# Patient Record
Sex: FEMALE | Race: BLACK OR AFRICAN AMERICAN | Employment: FULL TIME | URBAN - METROPOLITAN AREA
[De-identification: names, ages, dates, MRNs, and addresses within clinical notes are randomized per-mention and may not be internally consistent; named-entity substitution may affect disease eponyms.]

---

## 2018-01-31 ENCOUNTER — HOSPITAL ENCOUNTER (EMERGENCY)
Age: 48
Discharge: HOME | End: 2018-01-31
Attending: EMERGENCY MEDICINE
Payer: SELF-PAY

## 2018-01-31 VITALS
OXYGEN SATURATION: 100 % | SYSTOLIC BLOOD PRESSURE: 133 MMHG | HEIGHT: 64 IN | BODY MASS INDEX: 24.92 KG/M2 | HEART RATE: 84 BPM | DIASTOLIC BLOOD PRESSURE: 75 MMHG | RESPIRATION RATE: 16 BRPM | WEIGHT: 146 LBS | TEMPERATURE: 98.8 F
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
## 2018-01-31 DIAGNOSIS — M54.6 BILATERAL THORACIC BACK PAIN, UNSPECIFIED CHRONICITY: ICD-10-CM

## 2018-01-31 DIAGNOSIS — M62.838 MUSCLE SPASM: ICD-10-CM

## 2018-01-31 DIAGNOSIS — M54.41 ACUTE RIGHT-SIDED LOW BACK PAIN WITH RIGHT-SIDED SCIATICA: ICD-10-CM

## 2018-01-31 PROCEDURE — 99282 EMERGENCY DEPT VISIT SF MDM: CPT

## 2018-01-31 RX ORDER — CYCLOBENZAPRINE HCL 10 MG
10 TABLET ORAL
Qty: 12 TAB | Refills: 0 | Status: SHIPPED | OUTPATIENT
Start: 2018-01-31

## 2018-01-31 RX ORDER — IBUPROFEN 600 MG/1
600 TABLET ORAL
Qty: 20 TAB | Refills: 0 | Status: SHIPPED | OUTPATIENT
Start: 2018-01-31

## 2018-01-31 NOTE — DISCHARGE INSTRUCTIONS
Learning About Relief for Back Pain  What is back tension and strain? Back strain happens when you overstretch, or pull, a muscle in your back. You may hurt your back in an accident or when you exercise or lift something. Most back pain will get better with rest and time. You can take care of yourself at home to help your back heal.  What can you do first to relieve back pain? When you first feel back pain, try these steps:  · Walk. Take a short walk (10 to 20 minutes) on a level surface (no slopes, hills, or stairs) every 2 to 3 hours. Walk only distances you can manage without pain, especially leg pain. · Relax. Find a comfortable position for rest. Some people are comfortable on the floor or a medium-firm bed with a small pillow under their head and another under their knees. Some people prefer to lie on their side with a pillow between their knees. Don't stay in one position for too long. · Try heat or ice. Try using a heating pad on a low or medium setting, or take a warm shower, for 15 to 20 minutes every 2 to 3 hours. Or you can buy single-use heat wraps that last up to 8 hours. You can also try an ice pack for 10 to 15 minutes every 2 to 3 hours. You can use an ice pack or a bag of frozen vegetables wrapped in a thin towel. There is not strong evidence that either heat or ice will help, but you can try them to see if they help. You may also want to try switching between heat and cold. · Take pain medicine exactly as directed. ¨ If the doctor gave you a prescription medicine for pain, take it as prescribed. ¨ If you are not taking a prescription pain medicine, ask your doctor if you can take an over-the-counter medicine. What else can you do? · Stretch and exercise. Exercises that increase flexibility may relieve your pain and make it easier for your muscles to keep your spine in a good, neutral position. And don't forget to keep walking. · Do self-massage.  You can use self-massage to unwind after work or school or to energize yourself in the morning. You can easily massage your feet, hands, or neck. Self-massage works best if you are in comfortable clothes and are sitting or lying in a comfortable position. Use oil or lotion to massage bare skin. · Reduce stress. Back pain can lead to a vicious [place]: Distress about the pain tenses the muscles in your back, which in turn causes more pain. Learn how to relax your mind and your muscles to lower your stress. Where can you learn more? Go to http://juan-torin.info/. Enter H926 in the search box to learn more about \"Learning About Relief for Back Pain. \"  Current as of: March 21, 2017  Content Version: 11.5  © 6786-0028 [employer]. Care instructions adapted under license by [employer] (which disclaims liability or warranty for this information). If you have questions about a medical condition or this instruction, always ask your healthcare professional. [place] [address] any warranty or liability for your use of this information. Back Pain: Care Instructions  Your Care Instructions    Back pain has many possible causes. It is often related to problems with muscles and ligaments of the back. It may also be related to problems with the nerves, discs, or bones of the back. Moving, lifting, standing, sitting, or sleeping in an awkward way can strain the back. Sometimes you don't notice the injury until later. Arthritis is another common cause of back pain. Although it may hurt a lot, back pain usually improves on its own within several weeks. Most people recover in 12 weeks or less. Using good home treatment and being careful not to stress your back can help you feel better sooner. Follow-up care is a key part of your treatment and safety. Be sure to make and go to all appointments, and call your doctor if you are having problems.  It's also a good idea to know your test results and keep a list of the medicines you take. How can you care for yourself at home? · Sit or lie in positions that are most comfortable and reduce your pain. Try one of these positions when you lie down:  ¨ Lie on your back with your knees bent and supported by large pillows. ¨ Lie on the floor with your legs on the seat of a sofa or chair. Donnel Seller on your side with your knees and hips bent and a pillow between your legs. ¨ Lie on your stomach if it does not make pain worse. · Do not sit up in bed, and avoid soft couches and twisted positions. Bed rest can help relieve pain at first, but it delays healing. Avoid bed rest after the first day of back pain. · Change positions every 30 minutes. If you must sit for long periods of time, take breaks from sitting. Get up and walk around, or lie in a comfortable position. · Try using a heating pad on a low or medium setting for 15 to 20 minutes every 2 or 3 hours. Try a warm shower in place of one session with the heating pad. · You can also try an ice pack for 10 to 15 minutes every 2 to 3 hours. Put a thin cloth between the ice pack and your skin. · Take pain medicines exactly as directed. ¨ If the doctor gave you a prescription medicine for pain, take it as prescribed. ¨ If you are not taking a prescription pain medicine, ask your doctor if you can take an over-the-counter medicine. · Take short walks several times a day. You can start with 5 to 10 minutes, 3 or 4 times a day, and work up to longer walks. Walk on level surfaces and avoid hills and stairs until your back is better. · Return to work and other activities as soon as you can. Continued rest without activity is usually not good for your back. · To prevent future back pain, do exercises to stretch and strengthen your back and stomach. Learn how to use good posture, safe lifting techniques, and proper body mechanics. When should you call for help?   Call your doctor now or seek immediate medical care if:  ? · You have new or worsening numbness in your legs. ? · You have new or worsening weakness in your legs. (This could make it hard to stand up.)   ? · You lose control of your bladder or bowels. ? Watch closely for changes in your health, and be sure to contact your doctor if:  ? · Your pain gets worse. ? · You are not getting better after 2 weeks. Where can you learn more? Go to http://juan-torin.info/. Enter U977 in the search box to learn more about \"Back Pain: Care Instructions. \"  Current as of: March 21, 2017  Content Version: 11.4  © 4978-6891 [employer]. Care instructions adapted under license by [employer] (which disclaims liability or warranty for this information). If you have questions about a medical condition or this instruction, always ask your healthcare professional. [address] any warranty or liability for your use of this information.
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
## 2018-01-31 NOTE — ED NOTES
Michelet Herr is a 41 y.o. female that was discharged in stable condition. The patients diagnosis, condition and treatment were explained to  patient and aftercare instructions were given. The patient verbalized understanding. Patient armband removed and shredded.

## 2018-01-31 NOTE — LETTER
[facility] EMERGENCY DEPT 
[address] [facility] [address] 
[phone] Work/School Note Date: 1/31/2018 To Whom It May concern: 
 
Chyna Andrews was seen and treated today in the emergency room by the following provider(s): 
Attending Provider: Mcihael Parker MD 
Nurse Practitioner: Suly Barrios NP. Chyna Andrews may return to work on 02/02/2018. Sincerely, Suly Barrios NP

## 2018-07-11 ENCOUNTER — TELEPHONE (OUTPATIENT)
Dept: PAIN MANAGEMENT | Age: 48
End: 2018-07-11

## 2023-01-11 ENCOUNTER — OFFICE VISIT (OUTPATIENT)
Dept: ORTHOPEDIC SURGERY | Age: 53
End: 2023-01-11
Payer: COMMERCIAL

## 2023-01-11 VITALS — BODY MASS INDEX: 29.02 KG/M2 | WEIGHT: 170 LBS | TEMPERATURE: 97.7 F | HEIGHT: 64 IN | RESPIRATION RATE: 16 BRPM

## 2023-01-11 DIAGNOSIS — M75.101 TEAR OF RIGHT ROTATOR CUFF, UNSPECIFIED TEAR EXTENT, UNSPECIFIED WHETHER TRAUMATIC: Primary | ICD-10-CM

## 2023-01-11 DIAGNOSIS — M25.511 CHRONIC RIGHT SHOULDER PAIN: ICD-10-CM

## 2023-01-11 DIAGNOSIS — G89.29 CHRONIC RIGHT SHOULDER PAIN: ICD-10-CM

## 2023-01-11 RX ORDER — MELOXICAM 15 MG/1
15 TABLET ORAL
Qty: 30 TABLET | Refills: 1 | Status: SHIPPED | OUTPATIENT
Start: 2023-01-11

## 2023-01-11 NOTE — PROGRESS NOTES
Laurent Russell  1970   Chief Complaint   Patient presents with    Shoulder Pain     Rt         HISTORY OF PRESENT ILLNESS  Laurent Russell is a 50 y.o. female who presents today for evaluation of right shoulder pain. She rates her pain 7/10 today. Pain has been present for around 9 months. No specific injury. Her PCP told the patient she had a rotator cuff tear. She notes pain in the front of the shoulder which can also extend to the shoulder blade region. Has pain with certain movements. Endorses night pain. She has two jobs, one of which requires cleaning. Has tried following treatments: Injections:NO; Brace:NO; Therapy:NO; Cane/Crutch:NO       Allergies   Allergen Reactions    Flagyl [Metronidazole] Nausea and Vomiting        Past Medical History:   Diagnosis Date    GERD (gastroesophageal reflux disease)       Social History     Socioeconomic History    Marital status:      Spouse name: Not on file    Number of children: Not on file    Years of education: Not on file    Highest education level: Not on file   Occupational History    Not on file   Tobacco Use    Smoking status: Never    Smokeless tobacco: Never   Substance and Sexual Activity    Alcohol use: Yes     Comment: rare    Drug use: No    Sexual activity: Not on file   Other Topics Concern    Not on file   Social History Narrative    Not on file     Social Determinants of Health     Financial Resource Strain: Not on file   Food Insecurity: Not on file   Transportation Needs: Not on file   Physical Activity: Not on file   Stress: Not on file   Social Connections: Not on file   Intimate Partner Violence: Not on file   Housing Stability: Not on file      Past Surgical History:   Procedure Laterality Date    HX  SECTION      HX GYN      ablation    HX ORTHOPAEDIC      left wrist carpal tunnel    HX PARTIAL HYSTERECTOMY      HX TUBAL LIGATION        History reviewed. No pertinent family history.    Current Outpatient Medications Medication Sig    ibuprofen (MOTRIN) 600 mg tablet Take 1 Tab by mouth every six (6) hours as needed for Pain. cyclobenzaprine (FLEXERIL) 10 mg tablet Take 1 Tab by mouth three (3) times daily as needed for Muscle Spasm(s). No current facility-administered medications for this visit. REVIEW OF SYSTEM   Patient denies: Weight loss, Fever/Chills, HA, Visual changes, Fatigue, Chest pain, SOB, Abdominal pain, N/V/D/C, Blood in stool or urine, Edema. Pertinent positive as above in HPI. All others were negative    PHYSICAL EXAM:   Visit Vitals  Temp 97.7 °F (36.5 °C) (Temporal)   Resp 16   Ht 5' 4\" (1.626 m)   Wt 170 lb (77.1 kg)   LMP 01/23/2014   BMI 29.18 kg/m²     The patient is a well-developed, well-nourished female   in no acute distress. The patient is alert and oriented times three. The patient is alert and oriented times three. Mood and affect are normal.  LYMPHATIC: lymph nodes are not enlarged and are within normal limits  SKIN: normal in color and non tender to palpation. There are no bruises or abrasions noted. NEUROLOGICAL: Motor sensory exam is within normal limits. Reflexes are equal bilaterally. There is normal sensation to pinprick and light touch  MUSCULOSKELETAL:  Examination Right shoulder   Skin Intact   AC joint tenderness -   Biceps tenderness -   Forward flexion/Elevation ROM 60   Active abduction ROM 60   Glenohumeral abduction 90   External rotation ROM 30   Internal rotation ROM 30   Apprehension -   Shiras Relocation -   Jerk -   Load and Shift -   Obriens -   Speeds -   Impingement sign +   Supraspinatus/Empty Can +   External Rotation Strength -, 5/5   Lift Off/Belly Press -, 5/5   Neurovascular Intact        IMAGING: XR of the right shoulder with 3 views obtained in the office dated 1/11/2023 was reviewed and read by Dr. Jacy Woods: No acute abnormalities       IMPRESSION:      ICD-10-CM ICD-9-CM    1.  Tear of right rotator cuff, unspecified tear extent, unspecified whether traumatic  M75.101 840.4       2. Chronic right shoulder pain  M25.511 719.41 AMB POC XRAY, SHOULDER; COMPLETE, 2+    G89.29 338.29            PLAN:  1. Pt presents today with right shoulder pain and I am ordering a right shoulder MRI to r/o RCT. Was also prescribed Mobic. Patient was provided with physician-directed home exercise program in the office today. Risk factors include: n/a  2. No ultrasound exam indicated today  3. No cortisone injection indicated today   4. No Physical/Occupational Therapy indicated today  5. Yes diagnostic test indicated today: MRI R SHOULDER  6. No durable medical equipment indicated today  7. No referral indicated today   8. Yes medications indicated today: MOBIC  9. No Narcotic indicated today      RTC following MRI      Scribed by Raffy Carrington) as dictated by Theressa Bamberger, MD    I, Dr. Theressa Bamberger, confirm that all documentation is accurate.     Theressa Bamberger, M.D.   [facility]

## 2023-01-19 ENCOUNTER — TELEPHONE (OUTPATIENT)
Dept: ORTHOPEDIC SURGERY | Age: 53
End: 2023-01-19

## 2023-01-19 DIAGNOSIS — F41.9 ANXIETY: Primary | ICD-10-CM

## 2023-01-19 DIAGNOSIS — M75.101 TEAR OF RIGHT ROTATOR CUFF, UNSPECIFIED TEAR EXTENT, UNSPECIFIED WHETHER TRAUMATIC: ICD-10-CM

## 2023-01-19 RX ORDER — DICLOFENAC SODIUM 75 MG/1
75 TABLET, DELAYED RELEASE ORAL 2 TIMES DAILY WITH MEALS
Qty: 60 TABLET | Refills: 2 | Status: SHIPPED | OUTPATIENT
Start: 2023-01-19 | End: 2023-01-20 | Stop reason: SDUPTHER

## 2023-01-19 RX ORDER — LORAZEPAM 1 MG/1
TABLET ORAL
Qty: 3 TABLET | Refills: 0 | Status: SHIPPED | OUTPATIENT
Start: 2023-01-19

## 2023-01-19 NOTE — TELEPHONE ENCOUNTER
Patient states the Meloxicam is not  helping her with the pain and just makes her super sleepy. She would like to know if she can have something different. Patient will also be having an MRI on 01/24 and would like something for anxiety to be sent over to her pharmacy.

## 2023-01-19 NOTE — TELEPHONE ENCOUNTER
Stop taking mobic. Voltaren sent instead. Ativan sent for MRI to help her relax.  Make sure she has apt with [facility] following MRI

## 2023-01-19 NOTE — TELEPHONE ENCOUNTER
Patient wants to know if she can take Tylenol during the day and the voltaren at night. Please advise.

## 2023-01-20 NOTE — TELEPHONE ENCOUNTER
Spoke with patient. Patient verbalized understanding that she may take Tylenol during the day and Voltaren at night. Patient states that the pharmacy did not have the Rx.   I instructed the patient that in our records, it does, in fact show that the pharmacy did receive confirmation on 1-19-23 at 12:11pm.

## 2023-01-24 ENCOUNTER — HOSPITAL ENCOUNTER (OUTPATIENT)
Dept: MRI IMAGING | Age: 53
Discharge: HOME | End: 2023-01-24
Attending: ORTHOPAEDIC SURGERY
Payer: COMMERCIAL

## 2023-01-24 DIAGNOSIS — M75.101 TEAR OF RIGHT ROTATOR CUFF, UNSPECIFIED TEAR EXTENT, UNSPECIFIED WHETHER TRAUMATIC: ICD-10-CM

## 2023-01-24 PROCEDURE — 73221 MRI JOINT UPR EXTREM W/O DYE: CPT

## 2023-01-31 ENCOUNTER — OFFICE VISIT (OUTPATIENT)
Dept: ORTHOPEDIC SURGERY | Age: 53
End: 2023-01-31
Payer: COMMERCIAL

## 2023-01-31 VITALS — HEIGHT: 64 IN | BODY MASS INDEX: 29.19 KG/M2 | TEMPERATURE: 97.8 F | RESPIRATION RATE: 16 BRPM | WEIGHT: 171 LBS

## 2023-01-31 DIAGNOSIS — M75.111 INCOMPLETE TEAR OF RIGHT ROTATOR CUFF, UNSPECIFIED WHETHER TRAUMATIC: Primary | ICD-10-CM

## 2023-01-31 PROCEDURE — 99213 OFFICE O/P EST LOW 20 MIN: CPT | Performed by: PHYSICIAN ASSISTANT

## 2023-01-31 NOTE — PROGRESS NOTES
Laurent Russell  1970   Chief Complaint   Patient presents with    Results     Rt shoulder MRI        HISTORY OF PRESENT ILLNESS  Laurent Russell is a 50 y.o. female who presents today for reevaluation of right shoulder and MRI review. Patient rates pain as 8/10 today. Pain has been present for around 9 months. No specific injury. Her PCP told the patient she had a rotator cuff tear. She notes pain in the front of the shoulder which can also extend to the shoulder blade region. Has pain with certain movements. She reports no change in symptoms since last OV, continues to have significant night pain. She has two jobs, one of which requires cleaning. Patient denies any fever, chills, chest pain, shortness of breath or calf pain. The remainder of the review of systems in negative. There are no new illness or injuries to report since last seen in the office. There are no changes to medications, allergies, family or social history. PHYSICAL EXAM:   Visit Vitals  Temp 97.8 °F (36.6 °C) (Temporal)   Resp 16   Ht 5' 4\" (1.626 m)   Wt 171 lb (77.6 kg)   LMP 01/23/2014   BMI 29.35 kg/m²     The patient is a well-developed, well-nourished female   in no acute distress. The patient is alert and oriented times three. The patient is alert and oriented times three. Mood and affect are normal.  LYMPHATIC: lymph nodes are not enlarged and are within normal limits  SKIN: normal in color and non tender to palpation. There are no bruises or abrasions noted. NEUROLOGICAL: Motor sensory exam is within normal limits. Reflexes are equal bilaterally.  There is normal sensation to pinprick and light touch  MUSCULOSKELETAL:  Examination Right shoulder   Skin Intact   AC joint tenderness -   Biceps tenderness -   Forward flexion/Elevation ROM 60   Active abduction ROM 60   Glenohumeral abduction 90   External rotation ROM 30   Internal rotation ROM 30   Apprehension -   Shiras Relocation -   Jerk -   Load and Shift - Viktor Dawson -   Speeds -   Impingement sign +   Supraspinatus/Empty Can +   External Rotation Strength -, 5/5   Lift Off/Belly Press -, 5/5   Neurovascular Intact         IMAGING: MRI of right shoulder dated 1/24/2023 was reviewed and read by myself reveals:   IMPRESSION:  1. Bursal sided partial-thickness tearing of anterior supraspinatus tendon with  width high-grade component. No full-thickness tendon tear or tendon retraction  demonstrated. 2. Nondisplaced labral tear shown posteriorly at 9:00. XR of the right shoulder with 3 views obtained in the office dated 1/11/2023 was reviewed and read by Dr. Marcin Velasquez: No acute abnormalities     IMPRESSION:      ICD-10-CM ICD-9-CM    1. Incomplete tear of right rotator cuff, unspecified whether traumatic  M75.111 840.4            PLAN:   1. I discussed the risks and benefits and potential adverse outcomes of both operative vs non operative treatment of right shoulder partial RCT with the patient and patient wishes to proceed with arthroscopic right shoulder RCR. Risks of operative intervention include but not limited to bleeding, infection, deep vein thrombosis, pulmonary embolism, death, limb length discrepancy, reflexive sympathetic dystrophy, fat embolism syndrome,damage to blood vessels and nerves, malunion, non-union, delayed union, failure of hardware, post traumatic arthritis, stroke, heart attack, and death. Patient understands that infection may arise and may require numerous surgeries. The patient was counseled at length about the risks of mary kay Covid-19 during their perioperative period and any recovery window from their procedure. The patient was made aware that mary kay Covid-19  may worsen their prognosis for recovering from their procedure and lend to a higher morbidity and/or mortality risk. All material risks, benefits, and reasonable alternatives including postponing the procedure were discussed.  The patient does  wish to proceed with the procedure at this time. History and physical exam to be preformed at a later date. Risk factors include: n/a  2. No ultrasound exam indicated today  3. No cortisone injection indicated today   4. No Physical/Occupational Therapy indicated today  5. No diagnostic test indicated today:   6. No durable medical equipment indicated today  7. No referral indicated today   8. No medications indicated today:   9.  No Narcotic indicated today        RTC H&P      Scribed by Christina Bravo) as dictated by OLIVIA Ramirez PA-C [phone]

## 2023-02-06 ENCOUNTER — HOSPITAL ENCOUNTER (OUTPATIENT)
Dept: LAB | Age: 53
Discharge: HOME | End: 2023-02-06
Payer: COMMERCIAL

## 2023-02-06 DIAGNOSIS — M75.111 INCOMPLETE TEAR OF RIGHT ROTATOR CUFF, UNSPECIFIED WHETHER TRAUMATIC: ICD-10-CM

## 2023-02-06 DIAGNOSIS — Z01.818 PREOP EXAMINATION: Primary | ICD-10-CM

## 2023-02-06 DIAGNOSIS — Z01.818 PREOP EXAMINATION: ICD-10-CM

## 2023-02-06 LAB
ALBUMIN SERPL-MCNC: 4.1 G/DL (ref 3.4–5)
ALBUMIN/GLOB SERPL: 1.2 (ref 0.8–1.7)
ALP SERPL-CCNC: 76 U/L (ref 45–117)
ALT SERPL-CCNC: 21 U/L (ref 13–56)
ANION GAP SERPL CALC-SCNC: 5 MMOL/L (ref 3–18)
AST SERPL-CCNC: 11 U/L (ref 10–38)
ATRIAL RATE: 79 BPM
BASOPHILS # BLD: 0 K/UL (ref 0–0.1)
BASOPHILS NFR BLD: 0 % (ref 0–2)
BILIRUB SERPL-MCNC: 0.4 MG/DL (ref 0.2–1)
BUN SERPL-MCNC: 11 MG/DL (ref 7–18)
BUN/CREAT SERPL: 15 (ref 12–20)
CALCIUM SERPL-MCNC: 9.5 MG/DL (ref 8.5–10.1)
CALCULATED P AXIS, ECG09: 44 DEGREES
CALCULATED R AXIS, ECG10: -6 DEGREES
CALCULATED T AXIS, ECG11: 20 DEGREES
CHLORIDE SERPL-SCNC: 107 MMOL/L (ref 100–111)
CO2 SERPL-SCNC: 28 MMOL/L (ref 21–32)
CREAT SERPL-MCNC: 0.73 MG/DL (ref 0.6–1.3)
DIAGNOSIS, 93000: NORMAL
DIFFERENTIAL METHOD BLD: ABNORMAL
EOSINOPHIL # BLD: 0 K/UL (ref 0–0.4)
EOSINOPHIL NFR BLD: 1 % (ref 0–5)
ERYTHROCYTE [DISTWIDTH] IN BLOOD BY AUTOMATED COUNT: 12.6 % (ref 11.6–14.5)
GLOBULIN SER CALC-MCNC: 3.4 G/DL (ref 2–4)
GLUCOSE SERPL-MCNC: 96 MG/DL (ref 74–99)
HCT VFR BLD AUTO: 35.2 % (ref 35–45)
HGB BLD-MCNC: 11.6 G/DL (ref 12–16)
IMM GRANULOCYTES # BLD AUTO: 0 K/UL (ref 0–0.04)
IMM GRANULOCYTES NFR BLD AUTO: 0 % (ref 0–0.5)
LYMPHOCYTES # BLD: 1.9 K/UL (ref 0.9–3.6)
LYMPHOCYTES NFR BLD: 38 % (ref 21–52)
MCH RBC QN AUTO: 28.1 PG (ref 24–34)
MCHC RBC AUTO-ENTMCNC: 33 G/DL (ref 31–37)
MCV RBC AUTO: 85.2 FL (ref 78–100)
MONOCYTES # BLD: 0.4 K/UL (ref 0.05–1.2)
MONOCYTES NFR BLD: 8 % (ref 3–10)
NEUTS SEG # BLD: 2.7 K/UL (ref 1.8–8)
NEUTS SEG NFR BLD: 53 % (ref 40–73)
NRBC # BLD: 0 K/UL (ref 0–0.01)
NRBC BLD-RTO: 0 PER 100 WBC
P-R INTERVAL, ECG05: 150 MS
PLATELET # BLD AUTO: 346 K/UL (ref 135–420)
PMV BLD AUTO: 10.3 FL (ref 9.2–11.8)
POTASSIUM SERPL-SCNC: 3.5 MMOL/L (ref 3.5–5.5)
PROT SERPL-MCNC: 7.5 G/DL (ref 6.4–8.2)
Q-T INTERVAL, ECG07: 386 MS
QRS DURATION, ECG06: 68 MS
QTC CALCULATION (BEZET), ECG08: 442 MS
RBC # BLD AUTO: 4.13 M/UL (ref 4.2–5.3)
SODIUM SERPL-SCNC: 140 MMOL/L (ref 136–145)
VENTRICULAR RATE, ECG03: 79 BPM
WBC # BLD AUTO: 5.1 K/UL (ref 4.6–13.2)

## 2023-02-06 PROCEDURE — 36415 COLL VENOUS BLD VENIPUNCTURE: CPT

## 2023-02-06 PROCEDURE — 80053 COMPREHEN METABOLIC PANEL: CPT

## 2023-02-06 PROCEDURE — 85025 COMPLETE CBC W/AUTO DIFF WBC: CPT

## 2023-02-06 PROCEDURE — 93005 ELECTROCARDIOGRAM TRACING: CPT

## 2023-02-07 ENCOUNTER — TELEPHONE (OUTPATIENT)
Dept: ORTHOPEDIC SURGERY | Age: 53
End: 2023-02-07

## 2023-02-07 NOTE — TELEPHONE ENCOUNTER
Patient called in and stated that at her last visit [facility] told her to call in if she needed to be taken out of work. She says she's in so much pain right now at work and is calling to request that note. Patient can be reached at [phone].

## 2023-02-07 NOTE — TELEPHONE ENCOUNTER
Per other message by PA [facility], will keep patient out of work until surgery.

## 2023-02-10 ENCOUNTER — TELEPHONE (OUTPATIENT)
Dept: ORTHOPEDIC SURGERY | Age: 53
End: 2023-02-10

## 2023-02-10 NOTE — TELEPHONE ENCOUNTER
Patient is scheduled for surgery on 2- for her right rotator cuff repair. She needs a work note stating  her surgery and approximately how long she will be out of work to recover. Please call patient when note is ready.  [phone]

## 2023-02-10 NOTE — LETTER
NOTIFICATION RETURN TO WORK / SCHOOL    2/10/2023 3:09 PM    Ms. [place]cedo  [address] [id number]      To Whom It May Concern:    Lio Castellanos is currently under the care of [facility] Mer[facility]. She will be having surgery on  2-. She will be out of work X 12 weeks unless sedentary duty is available. If there are questions or concerns please have the patient contact our office.         Sincerely,      Endy Hemphill MD

## 2023-02-13 ENCOUNTER — CLINICAL DOCUMENTATION (OUTPATIENT)
Age: 53
End: 2023-02-13

## 2023-02-14 ENCOUNTER — OFFICE VISIT (OUTPATIENT)
Age: 53
End: 2023-02-14

## 2023-02-14 VITALS
SYSTOLIC BLOOD PRESSURE: 112 MMHG | DIASTOLIC BLOOD PRESSURE: 76 MMHG | WEIGHT: 171 LBS | HEIGHT: 64 IN | BODY MASS INDEX: 29.19 KG/M2 | TEMPERATURE: 97.8 F

## 2023-02-14 DIAGNOSIS — M75.111 INCOMPLETE ROTATOR CUFF TEAR OR RUPTURE OF RIGHT SHOULDER, NOT SPECIFIED AS TRAUMATIC: Primary | ICD-10-CM

## 2023-02-14 RX ORDER — GABAPENTIN 100 MG/1
100 CAPSULE ORAL 3 TIMES DAILY
Qty: 90 CAPSULE | Refills: 0 | Status: SHIPPED | OUTPATIENT
Start: 2023-02-14 | End: 2023-03-16

## 2023-02-14 RX ORDER — ACETAMINOPHEN 325 MG/1
1000 TABLET ORAL ONCE
Status: CANCELLED | OUTPATIENT
Start: 2023-02-14 | End: 2023-02-14

## 2023-02-14 RX ORDER — GABAPENTIN 100 MG/1
300 CAPSULE ORAL ONCE
Status: CANCELLED | OUTPATIENT
Start: 2023-02-14 | End: 2023-02-14

## 2023-02-14 RX ORDER — CELECOXIB 100 MG/1
200 CAPSULE ORAL ONCE
Status: CANCELLED | OUTPATIENT
Start: 2023-02-14 | End: 2023-02-14

## 2023-02-14 RX ORDER — OXYCODONE HYDROCHLORIDE 5 MG/1
5 TABLET ORAL EVERY 4 HOURS PRN
Qty: 40 TABLET | Refills: 0 | Status: SHIPPED | OUTPATIENT
Start: 2023-02-14 | End: 2023-02-21

## 2023-02-14 NOTE — PATIENT INSTRUCTIONS
Dr. Vy Nichols Shoulder Arthroscopy Information    What is the surgery? This is an outpatient procedure at either [facility] or [address] will be completely asleep for the procedure. Dr. Vy Nichols will make somewhere between 2-5 small incisions in your shoulder depending on the amount of work to be completed. He will take a tour of your shoulder with the camera and fix everything that needs to be fixed during your surgery. Total surgery takes about 45 mins to an hour and half depending on how much needed to be repaired    What can you expect after surgery? You will have a bulky dressing on your shoulder that you can remove 2 days after surgery. You will be able to shower 2 days after surgery but no soaking in a bath, hot tub, ocean or pool x 2 weeks to allow for full wound healing  You will be in a sling for 5-6 weeks depending on your repair. You will wear this sling whenever you are active, up moving around, and sleeping at night. This sling is to keep you from moving your arm on your own. You are essentially one armed until you are out of your sling. This means no reaching, pulling, grabbing or lifting with the operative arm. Dr. Vy Nichols will start physical therapy for you the first business day after surgery. While you cannot move your arm we allow physical therapy to gently move your shoulder. We call this passive range of motion. The goal of this is to decrease your stiffness and in turn decrease your post operative pain. Plan on being in physical therapy for 10-12 weeks    When can I return to work? Most patients return to desk work only after 2 weeks. You are able to type but there is no overhead work or lifting  You will start some gentle lifting up to 5-10lbs at about 8-10 weeks post operatively. You will gradually increase how much you are able to lift after this point under the guidance of Dr. Vy Nichols, his physician assistant and physical therapy.   At 6 months you are able to do all activities as tolerated but it may take you a full 9-12 months to fully recover from your surgery    Not all shoulder arthroscopies are the same. The specifics of your individual case will be discussed at length with you by Dr. Amy Marcum and his Physician Assistant. Anastasia Ceja  Surgical Coordinator  [address] [place] [address]. [facility].  [address], [email or web address]  P: [phone]  F: [phone]

## 2023-02-14 NOTE — H&P
HISTORY AND PHYSICAL          Patient: Anastasia Cancino                MRN: [id number]       SSN: [id number]  YOB: 1970          AGE: 50 y.o. SEX: female      Patient scheduled for:  right shoulder arthroscopic rotator cuff repair    Surgeon: Bandar Rossi MD    ANESTHESIA TYPE:  General    HISTORY:     The patient was seen in the office today for a preoperative history and physical for an upcoming above listed surgery. The patient is a pleasant 50 y.o. female who has a history of right shoulder pain . Patient rates pain as 8/10 today. Pain has been present for around 9 months. No specific injury. Her PCP told the patient she had a rotator cuff tear. She notes pain in the front of the shoulder which can also extend to the shoulder blade region. Has pain with certain movements. She reports no change in symptoms since last OV, continues to have significant  night pain. She has two jobs, one of which requires cleaning. Due to the current findings, affected activity of daily living and continued pain and discomfort, surgical intervention is indicated. The alternatives, risks, and complications, including but not limited to infection, blood loss, need for blood transfusion, neurovascular damage, sanju-incisional numbness, subcutaneous hematoma, bone fracture, anesthetic complications, DVT, PE, death, RSD, postoperative stiffness and pain, possible surgical scar, delayed healing and nonhealing, reflexive sympathetic dystrophy, damage to blood vessels and nerves, need for more surgery, MI, and stroke,  failure of hardware, gait disturbances,have been discussed. The patient understands and wishes to proceed with surgery.      PAST MEDICAL HISTORY:     Past Medical History:   Diagnosis Date    GERD (gastroesophageal reflux disease)         CURRENT MEDICATIONS:     Current Outpatient Medications   Medication Sig    cyclobenzaprine (FLEXERIL) 10 MG tablet Take 10 mg by mouth 3 times daily as needed    ibuprofen (ADVIL;MOTRIN) 600 MG tablet Take 600 mg by mouth every 6 hours as needed    meloxicam (MOBIC) 15 MG tablet Take 15 mg by mouth daily (with breakfast)     No current facility-administered medications for this visit. ALLERGIES:     Allergies   Allergen Reactions    Metronidazole Nausea And Vomiting         SURGICAL HISTORY:     Past Surgical History:   Procedure Laterality Date     SECTION      GYN      ablation    ORTHOPEDIC SURGERY      left wrist carpal tunnel    PARTIAL HYSTERECTOMY (CERVIX NOT REMOVED)      TUBAL LIGATION          SOCIAL HISTORY:     Social History       Tobacco History       Smoking Status  Never      Smokeless Tobacco Use  Never              Alcohol History       Alcohol Use Status  Yes              Drug Use       Drug Use Status  No              Sexual Activity       Sexually Active  Not Asked                     FAMILY HISTORY:     No family history on file. REVIEW OF SYSTEMS:     Negative for fevers, chills, chest pain, shortness of breath, weight loss, recent illness     General: Negative for fever and chills. No unexpected change in weight. Denies fatigue. No change in appetite. Skin: Negative for rash or itching. HEENT: Negative for congestion, sore throat, neck pain and neck stiffness. No change in vision or hearing. Hasn't noted any enlarged lymph nodes in the neck. Cardiovascular:  Negative for chest pain and palpitations. Has not noted pedal edema. Respiratory: Negative for cough, colds, sinus, hemoptysis, shortness of breath and wheezing. Gastrointestinal: Negative for nausea and vomiting, rectal bleeding, coffee ground emesis, abdominal pain, diarrhea and constipation. Genitourinary: Negative for dysuria, frequency urgency, or burning on micturition. No flank pain, no foul smelling urine, no difficulty with initiating urination. Hematological: Negative for bleeding or easy bruising.   Musculoskeletal: Negative  for arthralgias, back pain or neck pain. Neurological: Negative for dizziness, seizures or syncopal episodes. Denies headaches. Endocrine: Denies excessive thirst.  No heat/cold intolerance. Psychiatric: Negative for depression or insomnia. PHYSICAL EXAMINATION:     VITALS: There were no vitals taken for this visit. GEN:  Well developed, well nourished 50 y.o. female in no acute distress. HEENT: Normocephalic and atraumatic. Eyes: Conjunctivae and EOM are normal.Pupils are equal, round, and reactive to light. External ear normal appearance, external nose normal appearing. Mouth/Throat: Oropharynx is clear and moist, able to handle oral secretions w/out difficulty, airway patent  NECK: Supple. Normal ROM, No lymphadenopathy. Trachea is midline. No bruising, swelling or deformity  RESP: Clear to auscultation bilaterally. No wheezes, rales, rhonchi. Normal effort and breath sounds. No respiratory distress  CARDIO:  Normal rate, regular rhythm and normal heart sounds. No MGR. ABDOMEN: Soft, non-tender, non-distended, normoactive bowel sounds in all four quadrants. There is no tenderness. There is no rebound and no guarding. BACK: No CVA or spinal tenderness  BREAST:  Deferred  PELVIC:    Deferred   RECTAL:  Deferred   :           Deferred  EXTREMITIES: EXAMINATION OF: right shoulder      Examination  Right shoulder     Skin  Intact     AC joint tenderness  -     Biceps tenderness  -     Forward flexion/Elevation ROM  60     Active abduction ROM  60     Glenohumeral abduction  90     External rotation ROM  30     Internal rotation ROM  30     Apprehension  -     Shiva's Relocation  -     Jerk  -     Load and Shift  -     O'briens  -     Speeds  -     Impingement sign  +     Supraspinatus/Empty Can  +     External Rotation Strength  -, 5/5     Lift Off/Belly Press  -, 5/5        Neurovascular  Intact               NEUROVASCULAR: Sensation intact to light touch and strength grossly intact and symmetrical. No nystagmus. Positive distal pulses and capillary refill. DVT ASSESSMENT:  There is not calf tenderness. No evidence of DVT seen on physical exam.  MOTOR: In tact  PSYCH: Alert an oriented to person, place and time. Mood, memory, affect, behavior and judgment normal       RADIOGRAPHS & DIAGNOSTIC STUDIES:     MRI/xray reveals :   IMAGING: MRI of right shoulder  dated 1/24/2023 was reviewed and read by myself reveals:    IMPRESSION:  1. Bursal sided partial-thickness tearing of anterior supraspinatus tendon with   width high-grade component. No full-thickness tendon tear or tendon retraction   demonstrated. 2. Nondisplaced labral tear shown posteriorly at 9:00. XR of the right shoulder with 3 views obtained in the office dated 1/11/2023 was reviewed and read by Dr. Cheng Ke: No acute abnormalities       LABS:       @CBC:   Lab Results   Component Value Date/Time    WBC 5.1 02/06/2023 09:46 AM    RBC 4.13 02/06/2023 09:46 AM     BMP:   Lab Results   Component Value Date/Time    GLUCOSE 96 02/06/2023 09:46 AM    CO2 28 02/06/2023 09:46 AM    BUN 11 02/06/2023 09:46 AM    CREATININE 0.73 02/06/2023 09:46 AM    CALCIUM 9.5 02/06/2023 09:46 AM   @    Preoperative labs were reviewed and are substantially within normal limits   EKG: unchanged from previous tracings. ASSESSMENT:       Encounter Diagnosis   Name Primary? Incomplete rotator cuff tear or rupture of right shoulder, not specified as traumatic Yes       PLAN:     Again, the alternatives, risks, and complications, as well as expected outcome were discussed. The patient understands and agrees to proceed with right shoulder arthroscopic rotator cuff repair. The patient was counseled at length about the risks of blaise Covid-19 during their perioperative period and any recovery window from their procedure.   The patient was made aware that blaise Covid-19  may worsen their prognosis for recovering from their procedure and lend to a higher morbidity and/or mortality risk. All material risks, benefits, and reasonable alternatives including postponing the procedure were discussed. The patient does  wish to proceed with the procedure at this time. Patient given orders listed below:    No orders of the defined types were placed in this encounter.         Hannah Sanchez PA-C  2/14/2023  12:40 PM

## 2023-02-14 NOTE — PROGRESS NOTES
HISTORY AND PHYSICAL          Patient: Casie Kraus                MRN: [id number]       SSN: [id number]  YOB: 1970          AGE: 50 y.o. SEX: female      Patient scheduled for:  right shoulder arthroscopic rotator cuff repair    Surgeon: Jonna Lopez MD    ANESTHESIA TYPE:  General    HISTORY:     The patient was seen in the office today for a preoperative history and physical for an upcoming above listed surgery. The patient is a pleasant 50 y.o. female who has a history of right shoulder pain . Patient rates pain as 8/10 today. Pain has been present for around 9 months. No specific injury. Her PCP told the patient she had a rotator cuff tear. She notes pain in the front of the shoulder which can also extend to the shoulder blade region. Has pain with certain movements. She reports no change in symptoms since last OV, continues to have significant  night pain. She has two jobs, one of which requires cleaning. Due to the current findings, affected activity of daily living and continued pain and discomfort, surgical intervention is indicated. The alternatives, risks, and complications, including but not limited to infection, blood loss, need for blood transfusion, neurovascular damage, sanju-incisional numbness, subcutaneous hematoma, bone fracture, anesthetic complications, DVT, PE, death, RSD, postoperative stiffness and pain, possible surgical scar, delayed healing and nonhealing, reflexive sympathetic dystrophy, damage to blood vessels and nerves, need for more surgery, MI, and stroke,  failure of hardware, gait disturbances,have been discussed. The patient understands and wishes to proceed with surgery.      PAST MEDICAL HISTORY:     Past Medical History:   Diagnosis Date    GERD (gastroesophageal reflux disease)         CURRENT MEDICATIONS:     Current Outpatient Medications   Medication Sig    cyclobenzaprine (FLEXERIL) 10 MG tablet Take 10 mg by mouth 3 times daily as needed    ibuprofen (ADVIL;MOTRIN) 600 MG tablet Take 600 mg by mouth every 6 hours as needed    meloxicam (MOBIC) 15 MG tablet Take 15 mg by mouth daily (with breakfast)     No current facility-administered medications for this visit. ALLERGIES:     Allergies   Allergen Reactions    Metronidazole Nausea And Vomiting         SURGICAL HISTORY:     Past Surgical History:   Procedure Laterality Date     SECTION      GYN      ablation    ORTHOPEDIC SURGERY      left wrist carpal tunnel    PARTIAL HYSTERECTOMY (CERVIX NOT REMOVED)      TUBAL LIGATION          SOCIAL HISTORY:     Social History       Tobacco History       Smoking Status  Never      Smokeless Tobacco Use  Never              Alcohol History       Alcohol Use Status  Yes              Drug Use       Drug Use Status  No              Sexual Activity       Sexually Active  Not Asked                     FAMILY HISTORY:     No family history on file. REVIEW OF SYSTEMS:     Negative for fevers, chills, chest pain, shortness of breath, weight loss, recent illness     General: Negative for fever and chills. No unexpected change in weight. Denies fatigue. No change in appetite. Skin: Negative for rash or itching. HEENT: Negative for congestion, sore throat, neck pain and neck stiffness. No change in vision or hearing. Hasn't noted any enlarged lymph nodes in the neck. Cardiovascular:  Negative for chest pain and palpitations. Has not noted pedal edema. Respiratory: Negative for cough, colds, sinus, hemoptysis, shortness of breath and wheezing. Gastrointestinal: Negative for nausea and vomiting, rectal bleeding, coffee ground emesis, abdominal pain, diarrhea and constipation. Genitourinary: Negative for dysuria, frequency urgency, or burning on micturition. No flank pain, no foul smelling urine, no difficulty with initiating urination. Hematological: Negative for bleeding or easy bruising.   Musculoskeletal: Negative  for arthralgias, back pain or neck pain. Neurological: Negative for dizziness, seizures or syncopal episodes. Denies headaches. Endocrine: Denies excessive thirst.  No heat/cold intolerance. Psychiatric: Negative for depression or insomnia. PHYSICAL EXAMINATION:     VITALS: There were no vitals taken for this visit. GEN:  Well developed, well nourished 50 y.o. female in no acute distress. HEENT: Normocephalic and atraumatic. Eyes: Conjunctivae and EOM are normal.Pupils are equal, round, and reactive to light. External ear normal appearance, external nose normal appearing. Mouth/Throat: Oropharynx is clear and moist, able to handle oral secretions w/out difficulty, airway patent  NECK: Supple. Normal ROM, No lymphadenopathy. Trachea is midline. No bruising, swelling or deformity  RESP: Clear to auscultation bilaterally. No wheezes, rales, rhonchi. Normal effort and breath sounds. No respiratory distress  CARDIO:  Normal rate, regular rhythm and normal heart sounds. No MGR. ABDOMEN: Soft, non-tender, non-distended, normoactive bowel sounds in all four quadrants. There is no tenderness. There is no rebound and no guarding. BACK: No CVA or spinal tenderness  BREAST:  Deferred  PELVIC:    Deferred   RECTAL:  Deferred   :           Deferred  EXTREMITIES: EXAMINATION OF: right shoulder      Examination  Right shoulder     Skin  Intact     AC joint tenderness  -     Biceps tenderness  -     Forward flexion/Elevation ROM  60     Active abduction ROM  60     Glenohumeral abduction  90     External rotation ROM  30     Internal rotation ROM  30     Apprehension  -     Shiva's Relocation  -     Jerk  -     Load and Shift  -     O'briens  -     Speeds  -     Impingement sign  +     Supraspinatus/Empty Can  +     External Rotation Strength  -, 5/5     Lift Off/Belly Press  -, 5/5        Neurovascular  Intact               NEUROVASCULAR: Sensation intact to light touch and strength grossly intact and symmetrical. No nystagmus. Positive distal pulses and capillary refill. DVT ASSESSMENT:  There is not calf tenderness. No evidence of DVT seen on physical exam.  MOTOR: In tact  PSYCH: Alert an oriented to person, place and time. Mood, memory, affect, behavior and judgment normal       RADIOGRAPHS & DIAGNOSTIC STUDIES:     MRI/xray reveals :   IMAGING: MRI of right shoulder  dated 1/24/2023 was reviewed and read by myself reveals:    IMPRESSION:  1. Bursal sided partial-thickness tearing of anterior supraspinatus tendon with   width high-grade component. No full-thickness tendon tear or tendon retraction   demonstrated. 2. Nondisplaced labral tear shown posteriorly at 9:00. XR of the right shoulder with 3 views obtained in the office dated 1/11/2023 was reviewed and read by Dr. Cheng Ke: No acute abnormalities       LABS:       @CBC:   Lab Results   Component Value Date/Time    WBC 5.1 02/06/2023 09:46 AM    RBC 4.13 02/06/2023 09:46 AM     BMP:   Lab Results   Component Value Date/Time    GLUCOSE 96 02/06/2023 09:46 AM    CO2 28 02/06/2023 09:46 AM    BUN 11 02/06/2023 09:46 AM    CREATININE 0.73 02/06/2023 09:46 AM    CALCIUM 9.5 02/06/2023 09:46 AM   @    Preoperative labs were reviewed and are substantially within normal limits   EKG: unchanged from previous tracings. ASSESSMENT:       Encounter Diagnosis   Name Primary? Incomplete rotator cuff tear or rupture of right shoulder, not specified as traumatic Yes       PLAN:     Again, the alternatives, risks, and complications, as well as expected outcome were discussed. The patient understands and agrees to proceed with right shoulder arthroscopic rotator cuff repair. The patient was counseled at length about the risks of blaise Covid-19 during their perioperative period and any recovery window from their procedure.   The patient was made aware that blaise Covid-19  may worsen their prognosis for recovering from their procedure and lend to a higher morbidity and/or mortality risk. All material risks, benefits, and reasonable alternatives including postponing the procedure were discussed. The patient does  wish to proceed with the procedure at this time. Patient given orders listed below:    No orders of the defined types were placed in this encounter.         Stephen Manzo PA-C  2/14/2023  12:40 PM

## 2023-02-17 ENCOUNTER — TELEPHONE (OUTPATIENT)
Age: 53
End: 2023-02-17

## 2023-02-21 ENCOUNTER — APPOINTMENT (OUTPATIENT)
Facility: HOSPITAL | Age: 53
End: 2023-02-21
Payer: COMMERCIAL

## 2023-02-21 ENCOUNTER — HOSPITAL ENCOUNTER (OUTPATIENT)
Facility: HOSPITAL | Age: 53
Setting detail: RECURRING SERIES
Discharge: HOME | End: 2023-02-24
Payer: COMMERCIAL

## 2023-02-21 ENCOUNTER — HOSPITAL ENCOUNTER (EMERGENCY)
Facility: HOSPITAL | Age: 53
Discharge: HOME | End: 2023-02-21
Attending: EMERGENCY MEDICINE | Admitting: EMERGENCY MEDICINE
Payer: COMMERCIAL

## 2023-02-21 VITALS
HEART RATE: 78 BPM | SYSTOLIC BLOOD PRESSURE: 121 MMHG | RESPIRATION RATE: 16 BRPM | DIASTOLIC BLOOD PRESSURE: 76 MMHG | OXYGEN SATURATION: 100 % | WEIGHT: 168 LBS | BODY MASS INDEX: 28.68 KG/M2 | TEMPERATURE: 98.2 F | HEIGHT: 64 IN

## 2023-02-21 DIAGNOSIS — M75.111 INCOMPLETE ROTATOR CUFF TEAR OR RUPTURE OF RIGHT SHOULDER, NOT SPECIFIED AS TRAUMATIC: Primary | ICD-10-CM

## 2023-02-21 DIAGNOSIS — G89.18 POST-OP PAIN: ICD-10-CM

## 2023-02-21 DIAGNOSIS — J02.9 SORETHROAT: ICD-10-CM

## 2023-02-21 DIAGNOSIS — R04.2 HEMOPTYSIS: Primary | ICD-10-CM

## 2023-02-21 LAB
ALBUMIN SERPL-MCNC: 3.6 G/DL (ref 3.4–5)
ALBUMIN/GLOB SERPL: 1 (ref 0.8–1.7)
ALP SERPL-CCNC: 68 U/L (ref 45–117)
ALT SERPL-CCNC: 26 U/L (ref 13–56)
ANION GAP SERPL CALC-SCNC: 3 MMOL/L (ref 3–18)
AST SERPL-CCNC: 17 U/L (ref 10–38)
BASOPHILS # BLD: 0 K/UL (ref 0–0.1)
BASOPHILS NFR BLD: 0 % (ref 0–2)
BILIRUB SERPL-MCNC: 0.2 MG/DL (ref 0.2–1)
BUN SERPL-MCNC: 8 MG/DL (ref 7–18)
BUN/CREAT SERPL: 10 (ref 12–20)
CALCIUM SERPL-MCNC: 8.5 MG/DL (ref 8.5–10.1)
CHLORIDE SERPL-SCNC: 106 MMOL/L (ref 100–111)
CO2 SERPL-SCNC: 29 MMOL/L (ref 21–32)
CREAT SERPL-MCNC: 0.8 MG/DL (ref 0.6–1.3)
D DIMER PPP FEU-MCNC: 0.62 UG/ML(FEU)
DIFFERENTIAL METHOD BLD: ABNORMAL
EOSINOPHIL # BLD: 0 K/UL (ref 0–0.4)
EOSINOPHIL NFR BLD: 0 % (ref 0–5)
ERYTHROCYTE [DISTWIDTH] IN BLOOD BY AUTOMATED COUNT: 12.5 % (ref 11.6–14.5)
GLOBULIN SER CALC-MCNC: 3.6 G/DL (ref 2–4)
GLUCOSE SERPL-MCNC: 176 MG/DL (ref 74–99)
HCT VFR BLD AUTO: 33.4 % (ref 35–45)
HGB BLD-MCNC: 11.2 G/DL (ref 12–16)
IMM GRANULOCYTES # BLD AUTO: 0.1 K/UL (ref 0–0.04)
IMM GRANULOCYTES NFR BLD AUTO: 1 % (ref 0–0.5)
LYMPHOCYTES # BLD: 3.3 K/UL (ref 0.9–3.6)
LYMPHOCYTES NFR BLD: 30 % (ref 21–52)
MCH RBC QN AUTO: 28.2 PG (ref 24–34)
MCHC RBC AUTO-ENTMCNC: 33.5 G/DL (ref 31–37)
MCV RBC AUTO: 84.1 FL (ref 78–100)
MONOCYTES # BLD: 0.5 K/UL (ref 0.05–1.2)
MONOCYTES NFR BLD: 4 % (ref 3–10)
NEUTS SEG # BLD: 7 K/UL (ref 1.8–8)
NEUTS SEG NFR BLD: 64 % (ref 40–73)
NRBC # BLD: 0 K/UL (ref 0–0.01)
NRBC BLD-RTO: 0 PER 100 WBC
PLATELET # BLD AUTO: 312 K/UL (ref 135–420)
PMV BLD AUTO: 10 FL (ref 9.2–11.8)
POTASSIUM SERPL-SCNC: 3.6 MMOL/L (ref 3.5–5.5)
PROT SERPL-MCNC: 7.2 G/DL (ref 6.4–8.2)
RBC # BLD AUTO: 3.97 M/UL (ref 4.2–5.3)
SODIUM SERPL-SCNC: 138 MMOL/L (ref 136–145)
WBC # BLD AUTO: 10.8 K/UL (ref 4.6–13.2)

## 2023-02-21 PROCEDURE — 6360000004 HC RX CONTRAST MEDICATION: Performed by: EMERGENCY MEDICINE

## 2023-02-21 PROCEDURE — 96374 THER/PROPH/DIAG INJ IV PUSH: CPT | Performed by: EMERGENCY MEDICINE

## 2023-02-21 PROCEDURE — 6370000000 HC RX 637 (ALT 250 FOR IP): Performed by: EMERGENCY MEDICINE

## 2023-02-21 PROCEDURE — 97110 THERAPEUTIC EXERCISES: CPT

## 2023-02-21 PROCEDURE — 97162 PT EVAL MOD COMPLEX 30 MIN: CPT

## 2023-02-21 PROCEDURE — 85379 FIBRIN DEGRADATION QUANT: CPT

## 2023-02-21 PROCEDURE — 99285 EMERGENCY DEPT VISIT HI MDM: CPT

## 2023-02-21 PROCEDURE — 96374 THER/PROPH/DIAG INJ IV PUSH: CPT

## 2023-02-21 PROCEDURE — 96375 TX/PRO/DX INJ NEW DRUG ADDON: CPT | Performed by: EMERGENCY MEDICINE

## 2023-02-21 PROCEDURE — 96375 TX/PRO/DX INJ NEW DRUG ADDON: CPT

## 2023-02-21 PROCEDURE — 93005 ELECTROCARDIOGRAM TRACING: CPT | Performed by: EMERGENCY MEDICINE

## 2023-02-21 PROCEDURE — 97535 SELF CARE MNGMENT TRAINING: CPT

## 2023-02-21 PROCEDURE — 6360000002 HC RX W HCPCS: Performed by: EMERGENCY MEDICINE

## 2023-02-21 PROCEDURE — 85025 COMPLETE CBC W/AUTO DIFF WBC: CPT

## 2023-02-21 PROCEDURE — 71045 X-RAY EXAM CHEST 1 VIEW: CPT

## 2023-02-21 PROCEDURE — 71275 CT ANGIOGRAPHY CHEST: CPT

## 2023-02-21 PROCEDURE — 99285 EMERGENCY DEPT VISIT HI MDM: CPT | Performed by: EMERGENCY MEDICINE

## 2023-02-21 PROCEDURE — 80053 COMPREHEN METABOLIC PANEL: CPT

## 2023-02-21 RX ORDER — MORPHINE SULFATE 4 MG/ML
4 INJECTION, SOLUTION INTRAMUSCULAR; INTRAVENOUS
Status: COMPLETED | OUTPATIENT
Start: 2023-02-21 | End: 2023-02-21

## 2023-02-21 RX ORDER — MAGNESIUM HYDROXIDE/ALUMINUM HYDROXICE/SIMETHICONE 120; 1200; 1200 MG/30ML; MG/30ML; MG/30ML
30 SUSPENSION ORAL
Status: COMPLETED | OUTPATIENT
Start: 2023-02-21 | End: 2023-02-21

## 2023-02-21 RX ORDER — DEXAMETHASONE 6 MG/1
12 TABLET ORAL
Qty: 6 TABLET | Refills: 0 | Status: SHIPPED | OUTPATIENT
Start: 2023-02-21 | End: 2023-02-24

## 2023-02-21 RX ORDER — LIDOCAINE HYDROCHLORIDE 20 MG/ML
15 SOLUTION OROPHARYNGEAL
Status: COMPLETED | OUTPATIENT
Start: 2023-02-21 | End: 2023-02-21

## 2023-02-21 RX ORDER — GABAPENTIN 300 MG/1
300 CAPSULE ORAL
Qty: 5 CAPSULE | Refills: 0 | Status: SHIPPED | OUTPATIENT
Start: 2023-02-21 | End: 2023-02-26

## 2023-02-21 RX ORDER — DEXAMETHASONE SODIUM PHOSPHATE 4 MG/ML
10 INJECTION, SOLUTION INTRA-ARTICULAR; INTRALESIONAL; INTRAMUSCULAR; INTRAVENOUS; SOFT TISSUE ONCE
Status: COMPLETED | OUTPATIENT
Start: 2023-02-21 | End: 2023-02-21

## 2023-02-21 RX ADMIN — IOPAMIDOL 100 ML: 755 INJECTION, SOLUTION INTRAVENOUS at 20:57

## 2023-02-21 RX ADMIN — ALUMINUM HYDROXIDE, MAGNESIUM HYDROXIDE, AND SIMETHICONE 30 ML: 200; 200; 20 SUSPENSION ORAL at 20:43

## 2023-02-21 RX ADMIN — LIDOCAINE HYDROCHLORIDE 15 ML: 20 SOLUTION ORAL; TOPICAL at 22:54

## 2023-02-21 RX ADMIN — LIDOCAINE HYDROCHLORIDE 15 ML: 20 SOLUTION ORAL; TOPICAL at 20:43

## 2023-02-21 RX ADMIN — ALUMINUM HYDROXIDE, MAGNESIUM HYDROXIDE, AND SIMETHICONE 30 ML: 200; 200; 20 SUSPENSION ORAL at 22:54

## 2023-02-21 RX ADMIN — DEXAMETHASONE SODIUM PHOSPHATE 10 MG: 4 INJECTION, SOLUTION INTRAMUSCULAR; INTRAVENOUS at 20:44

## 2023-02-21 RX ADMIN — MORPHINE SULFATE 4 MG: 4 INJECTION, SOLUTION INTRAMUSCULAR; INTRAVENOUS at 20:44

## 2023-02-21 ASSESSMENT — PAIN SCALES - GENERAL
PAINLEVEL_OUTOF10: 3
PAINLEVEL_OUTOF10: 6

## 2023-02-21 ASSESSMENT — LIFESTYLE VARIABLES
HOW MANY STANDARD DRINKS CONTAINING ALCOHOL DO YOU HAVE ON A TYPICAL DAY: PATIENT DOES NOT DRINK
HOW OFTEN DO YOU HAVE A DRINK CONTAINING ALCOHOL: NEVER

## 2023-02-21 ASSESSMENT — PAIN - FUNCTIONAL ASSESSMENT: PAIN_FUNCTIONAL_ASSESSMENT: 0-10

## 2023-02-21 NOTE — PROGRESS NOTES
PHYSICAL / OCCUPATIONAL THERAPY - DAILY TREATMENT NOTE (updated )    Patient Name: Zoe Kennedy    Date: 2023    : 1970  Insurance: Payor: Tara Haro / Plan: [address] [place] [address] / Product Type: *No Product type* /      Patient  verified Yes     Visit #   Current / Total 1 12   Time   In / Out 1:30 2:15   Pain   In / Out 10 10   Subjective Functional Status/Changes:     Changes to:  Meds, Allergies, Med Hx, Sx Hx? If yes, update Summary List no       TREATMENT AREA =  Right shoulder pain  OBJECTIVE         Therapeutic Procedures: Tx Min Billable or 1:1 Min (if diff from Tx Min) Procedure, Rationale, Specifics   15  30918 Therapeutic Exercise (timed):  increase ROM, strength, coordination, balance, and proprioception to improve patient's ability to progress to PLOF and address remaining functional goals. (see flow sheet as applicable)     Details if applicable:       10  05879 Self Care/Home Management (timed):  improve patient knowledge and understanding of pain reducing techniques, home safety, activity modification, physical therapy expectations, procedures and progression, and surgical precautions  to improve patient's ability to progress to PLOF and address remaining functional goals.   (see flow sheet as applicable)     Details if applicable:            Details if applicable:            Details if applicable:            Details if applicable:       [facility] BC Totals Reminder: bill using total billable min of TIMED therapeutic procedures (example: do not include dry needle or estim unattended, both untimed codes, in totals to left)  8-22 min = 1 unit; 23-37 min = 2 units; 38-52 min = 3 units; 53-67 min = 4 units; 68-82 min = 5 units   Total Total     [x]  Patient Education billed concurrently with other procedures   [x] Review HEP    [] Progressed/Changed HEP, detail:    [] Other detail:       Objective Information/Functional Measures/Assessment         Patient will continue to benefit from skilled PT / OT services to modify and progress therapeutic interventions, analyze and address functional mobility deficits, analyze and address ROM deficits, analyze and address strength deficits, analyze and address soft tissue restrictions, analyze and cue for proper movement patterns, and analyze and modify for postural abnormalities to address functional deficits and attain remaining goals. Progress toward goals / Updated goals:  [x]  See Progress Note/Recertification    Short Term Goals: To be accomplished in 1 weeks  The pt will be I and compliant with HEP  IE- issued HEP   Long Term Goals: To be accomplished in 6weeks  Improve FOTO score to the predicted outcome for improved ability for ADL              IE-  2. Improve PROM right shoulder scaption to 130 degrees for improved ability for ADL              IE- 10 degrees  3. Improve PROM right shoulder ER in the scapular plane to 45 degrees for improved ability for functional tasks. IE- lacking 10 degrees from neutral  4.  The pt will demonstrate full AROM of the right elbow for improved ability for Adl              IE- lacking 20 degrees of extension, 100 degrees of flexion    PLAN  Yes  Continue plan of care  []  Upgrade activities as tolerated  []  Discharge due to :  []  Other:    Wilver Madden, PT    2/21/2023    2:15 PM    Future Appointments   Date Time Provider Concetta Mcghee   2/27/2023  1:00 PM ARIEL Maldonado [facility]

## 2023-02-21 NOTE — ED TRIAGE NOTES
Ambulatory to triage. Reports she had rotator cuff surgery yesterday on her right shoulder. C/o sore throat, pain with swallowing, and coughed up bright red blood. States her orthopedic surgeon sent her here to rule out PE because \"the same thing happened to my mother. \"

## 2023-02-21 NOTE — PROGRESS NOTES
[address] [place], [facility] [address] OM:[phone] Fx: [phone]  Plan of Care / Statement of Necessity for Physical Therapy Services     Patient Name: Lauren Wilson : 1970   Medical   Diagnosis: Right shoulder pain Treatment Diagnosis: Right shoulder pain   Onset Date: 2022 / GRE:     Referral Source: Celena Ellison [facility]): 2023   Prior Hospitalization: See medical history Provider #: [id number]   Prior Level of Function: Functionally I with all activities   Comorbidities: Right shoulder pain   Medications: Verified on Patient Summary List     Assessment / key information:  49 y/o female presents s/p right shoulder RTC repair on 23. The pt presents wearing her sling with abd pillow in place. Bulky dressing is still in place and to remain until 23. PROM of the right shoulder as follows: flexion 10 degrees, ER lacking 10 degrees from neutral. The pt was educated in RTC precautions and pain control. The pt will benefit from PT to address the aforementioned impairments. Evaluation Complexity:  History:  MEDIUM  Complexity : 1-2 comorbidities / personal factors will impact the outcome/ POC ; Examination:  MEDIUM Complexity : 3 Standardized tests and measures addressin body structure, function, activity limitation and / or participation in recreation  ;Presentation:  MEDIUM Complexity : Evolving with changing characteristics  ; Clinical Decision Making:  HIGH Complexity : FOTO score of 1- 25  FOTO score = an established functional score where 100 = no disability  Overall Complexity Rating: MEDIUM  Problem List: pain affecting function, decrease ROM, decrease strength, decrease ADL/functional abilities, decrease activity tolerance, and decrease flexibility/joint mobility   Treatment Plan may include any combination of the followin Therapeutic Exercise, 26756 Neuromuscular Re-Education, 75631 Manual Therapy, 38401 Therapeutic Activity, 40742 Self Care/Home Management, 54850 Electrical Stim unattended, 42284 Electrical Stim attended, 08453 Vasopneumatic Device, and 23798 Ultrasound  Vasopnuematic compression justification:  Per bilateral girth measures taken and listed above the edema is considered significant and having an impact on the patient's self care and ADLs  Patient / Family readiness to learn indicated by: asking questions, trying to perform skills, interest, and return verbalization   Persons(s) to be included in education: patient (P)  Barriers to Learning/Limitations: none  Measures taken if barriers to learning present: n/a  Patient Goal (s): \"To get shoulder back to 100%\"  Patient Self Reported Health Status: good  Rehabilitation Potential: good    Short Term Goals: To be accomplished in 1 weeks  The pt will be I and compliant with [facility]  IE- issued HEP   Long Term Goals: To be accomplished in 6weeks  Improve FOTO score to the predicted outcome for improved ability for ADL   IE-  2. Improve PROM right shoulder scaption to 130 degrees for improved ability for ADL   IE- 10 degrees  3. Improve PROM right shoulder ER in the scapular plane to 45 degrees for improved ability for functional tasks. IE- lacking 10 degrees from neutral  4. The pt will demonstrate full AROM of the right elbow for improved ability for Adl   IE- lacking 20 degrees of extension, 100 degrees of flexion    Frequency / Duration: Patient to be seen 3 times per week for 6 weeks  Goals will be assigned and reassessed every 10 visits/ 30 days per guidelines .     Patient/ Caregiver education and instruction: Diagnosis, prognosis, self care, activity modification, brace/ splint application, exercises, and other surgical precautions  [x]  Plan of care has been reviewed with LONNY Madden, PT       2/21/2023       2:23 PM  ===================================================================  I certify that the above Therapy Services are being furnished while the patient is under my care. I agree with the treatment plan and certify that this therapy is necessary. [de-identified] Signature:_________________________   DATE:_________   TIME:________                           John Aldridge,*    ** Signature, Date and Time must be completed for valid certification **  Please sign and return to [facility] or you may fax the signed copy to [phone]. Thank you.

## 2023-02-22 LAB
EKG ATRIAL RATE: 88 BPM
EKG DIAGNOSIS: NORMAL
EKG P AXIS: 61 DEGREES
EKG P-R INTERVAL: 156 MS
EKG Q-T INTERVAL: 376 MS
EKG QRS DURATION: 86 MS
EKG QTC CALCULATION (BAZETT): 454 MS
EKG R AXIS: 6 DEGREES
EKG T AXIS: 33 DEGREES
EKG VENTRICULAR RATE: 88 BPM

## 2023-02-22 NOTE — ED PROVIDER NOTES
EMERGENCY DEPARTMENT HISTORY AND PHYSICAL EXAM      Patient Name: Danya Kauffman  MRN: [id number]  YOB: 1970  Provider: Jonatan Bazan MD  PCP: Milton Bello MD   Time/Date of evaluation: 8:18 PM EST on 23    History of Presenting Illness     Chief Complaint   Patient presents with    Other    Post-op Problem       History Provided By: Patient and Patient's      History (Narative):   Danya Kauffman is a 50 y.o. female with a PMHX of right shoulder rotator cuff surgery yesterday  who presents to the emergency department  by POV C/O sore throat, coughing, and when she coughed, and had some blood in it. She also feels like her uvula is enlarged and it is hanging down on the back of her throat. She called her physician because she was coughing up blood. They advised her to come to the ED for evaluation for PE. Her mom also recently had rotator cuff surgery and did have a pulmonary embolus after surgery. The patient denies any prior history of PEs, she is not currently taking any aspirin or blood thinners postop. Past History     Past Medical History:  Past Medical History:   Diagnosis Date    GERD (gastroesophageal reflux disease)        Past Surgical History:  Past Surgical History:   Procedure Laterality Date     SECTION      GYN      ablation    ORTHOPEDIC SURGERY      left wrist carpal tunnel    PARTIAL HYSTERECTOMY (CERVIX NOT REMOVED)      TUBAL LIGATION         Family History:  History reviewed. No pertinent family history. Social History:  Social History     Tobacco Use    Smoking status: Never    Smokeless tobacco: Never   Substance Use Topics    Alcohol use: Yes    Drug use: No       Medications:  No current facility-administered medications for this encounter. Current Outpatient Medications   Medication Sig Dispense Refill    gabapentin (NEURONTIN) 300 MG capsule Take 1 capsule by mouth nightly for 5 days.  Start evening of surgery x 5 days Max Daily Amount: 300 mg 5 capsule 0    diclofenac (VOLTAREN) 75 MG EC tablet Take 75 mg by mouth 2 times daily (with meals)      LORazepam (ATIVAN) 1 MG tablet 1-2 tab PO 20 mins Prior to procedure      gabapentin (NEURONTIN) 100 MG capsule Take 1 capsule by mouth 3 times daily for 30 days. Attending CODIE Kaplan [id number] Max Daily Amount: 300 mg 90 capsule 0    oxyCODONE (ROXICODONE) 5 MG immediate release tablet Take 1 tablet by mouth every 4 hours as needed for Pain for up to 7 days. Take lowest dose possible to manage pain for acute post operative pain. Do not take until after surgery Max Daily Amount: 30 mg 40 tablet 0    cyclobenzaprine (FLEXERIL) 10 MG tablet Take 10 mg by mouth 3 times daily as needed      ibuprofen (ADVIL;MOTRIN) 600 MG tablet Take 600 mg by mouth every 6 hours as needed      meloxicam (MOBIC) 15 MG tablet Take 15 mg by mouth daily (with breakfast)         Allergies: Allergies   Allergen Reactions    Hydrocodone-Acetaminophen Other (See Comments)    Metronidazole Nausea And Vomiting       Social Determinants of Health:  Social Determinants of Health     Tobacco Use: Low Risk     Smoking Tobacco Use: Never    Smokeless Tobacco Use: Never    Passive Exposure: Not on file   Alcohol Use: Not At Risk    Frequency of Alcohol Consumption: Never    Average Number of Drinks: Patient does not drink    Frequency of Binge Drinking: Never   Financial Resource Strain: Not on file   Food Insecurity: Not on file   Transportation Needs: Not on file   Physical Activity: Not on file   Stress: Not on file   Social Connections: Not on file   Intimate Partner Violence: Not on file   Depression: Not on file   Housing Stability: Not on file       Review of Systems     Negative except as listed above in HPI.     Physical Exam     Vitals:    02/21/23 1514 02/21/23 1815   BP: 122/78 124/69   Pulse: 76 75   Resp: 17 16   Temp: 99.6 °F (37.6 °C) 98.2 °F (36.8 °C)   TempSrc: Oral Oral   SpO2: 100% 100%   Weight: 168 lb (76.2 kg) Height: 5' 4\" (1.626 m)        Constitutional: Non-toxic appearing, no acute distress  Head: Normocephalic, Atraumatic  Oropharynx: Erythema of the oropharynx, enlargement of the uvula with erythema and edema. No stridor. Neck: Supple  Cardiovascular: Regular rate and rhythm, no murmurs, rubs, or gallops  Chest: Normal work of breathing and chest excursion bilaterally  Lungs: Clear to ausculation bilaterally  Abdomen: Soft, non tender, non distended, normoactive bowel sounds  Back: No evidence of trauma or deformity  Extremities: Right arm in sling. She is distally neurovascularly intact.   Skin: Warm and dry, normal cap refill  Neuro: Alert and appropriate, CN intact, normal speech, strength and sensation full and symmetric bilaterally, normal gait, normal coordination  Psychiatric: Normal mood and affect           Diagnostic Study Results     Labs:  Recent Results (from the past 12 hour(s))   CBC with Auto Differential    Collection Time: 02/21/23  7:00 PM   Result Value Ref Range    WBC 10.8 4.6 - 13.2 K/uL    RBC 3.97 (L) 4.20 - 5.30 M/uL    Hemoglobin 11.2 (L) 12.0 - 16.0 g/dL    Hematocrit 33.4 (L) 35.0 - 45.0 %    MCV 84.1 78.0 - 100.0 FL    MCH 28.2 24.0 - 34.0 PG    MCHC 33.5 31.0 - 37.0 g/dL    RDW 12.5 11.6 - 14.5 %    Platelets 523 956 - 746 K/uL    MPV 10.0 9.2 - 11.8 FL    Nucleated RBCs 0.0 0  WBC    nRBC 0.00 0.00 - 0.01 K/uL    Seg Neutrophils 64 40 - 73 %    Lymphocytes 30 21 - 52 %    Monocytes 4 3 - 10 %    Eosinophils % 0 0 - 5 %    Basophils 0 0 - 2 %    Immature Granulocytes 1 (H) 0.0 - 0.5 %    Segs Absolute 7.0 1.8 - 8.0 K/UL    Absolute Lymph # 3.3 0.9 - 3.6 K/UL    Absolute Mono # 0.5 0.05 - 1.2 K/UL    Absolute Eos # 0.0 0.0 - 0.4 K/UL    Basophils Absolute 0.0 0.0 - 0.1 K/UL    Absolute Immature Granulocyte 0.1 (H) 0.00 - 0.04 K/UL    Differential Type AUTOMATED     CMP    Collection Time: 02/21/23  7:00 PM   Result Value Ref Range    Sodium 138 136 - 145 mmol/L    Potassium 3.6 3.5 - 5.5 mmol/L    Chloride 106 100 - 111 mmol/L    CO2 29 21 - 32 mmol/L    Anion Gap 3 3.0 - 18 mmol/L    Glucose 176 (H) 74 - 99 mg/dL    BUN 8 7.0 - 18 MG/DL    Creatinine 0.80 0.6 - 1.3 MG/DL    Bun/Cre Ratio 10 (L) 12 - 20      Est, Glom Filt Rate >60 >60 ml/min/1.73m2    Calcium 8.5 8.5 - 10.1 MG/DL    Total Bilirubin 0.2 0.2 - 1.0 MG/DL    ALT 26 13 - 56 U/L    AST 17 10 - 38 U/L    Alk Phosphatase 68 45 - 117 U/L    Total Protein 7.2 6.4 - 8.2 g/dL    Albumin 3.6 3.4 - 5.0 g/dL    Globulin 3.6 2.0 - 4.0 g/dL    Albumin/Globulin Ratio 1.0 0.8 - 1.7     D-Dimer, Quantitative    Collection Time: 02/21/23  7:00 PM   Result Value Ref Range    D-Dimer, Quant 0.62 (H) <0.46 ug/ml(FEU)   EKG 12 Lead    Collection Time: 02/21/23  7:05 PM   Result Value Ref Range    Ventricular Rate 88 BPM    Atrial Rate 88 BPM    P-R Interval 156 ms    QRS Duration 86 ms    Q-T Interval 376 ms    QTc Calculation (Bazett) 454 ms    P Axis 61 degrees    R Axis 6 degrees    T Axis 33 degrees    Diagnosis Normal sinus rhythm        Radiologic Studies:   CTA CHEST W WO CONTRAST   Final Result      1. Negative for pulmonary embolism. 2. No acute abnormalities in the chest.   3. Couple pulmonary nodules 4 mm, consider follow-up CT chest in 12 months if   high risk patient. XR CHEST PORTABLE    (Results Pending)     CXR: No acute pulmonary abnormalities    EKG interpretation: (Preliminary)  EKG read by Dr. Rocio Gordon at 3471, normal sinus rhythm at a rate of 88, normal axis, normal intervals, no ST or T wave changes. Procedures     Procedures    ED Course     8:18 PM MENG Gordon MD) am the first provider for this patient. Initial assessment performed. I reviewed the vital signs, available nursing notes, past medical history, past surgical history, family history and social history.  The patients presenting problems have been discussed, and they are in agreement with the care plan formulated and outlined with them.  I have encouraged them to ask questions as they arise throughout their visit. Records Reviewed: Nursing Notes, Old Medical Records, and Clinical Records from a Different Specialty (orthopedics)    Is this patient to be included in the SEP-1 core measure due to severe sepsis or septic shock? No Exclusion criteria - the patient is NOT to be included for SEP-1 Core Measure due to: Infection is not suspected    MEDICATIONS ADMINISTERED IN THE ED:  Medications - No data to display         Medical Decision Making     CC/HPI Summary, DDx, ED Course, and Reassessment: The patient is a 65-year-old woman who is status post right rotator cuff surgery yesterday, who presents to the ED today with a sore throat, cough, and when she coughed she had blood in it. She also has an enlarged uvula that is irritating the back of her throat. She called her orthopedic surgeon and they advised her to come to the ED for evaluation for pulmonary embolus and she is coughing up blood. On exam she has a very erythematous oropharynx including her tonsils and her uvula. Her airway is intact and there is no stridor. Not tachycardic or hypoxic, however she does have an elevated D-dimer and I will order a CTA of her chest.    Meantime, she has been receiving Mylanta, viscous lidocaine, IV Decadron and IV morphine. Her CT of her chest is negative for pulmonary embolus. She will be discharged home with prescriptions for Decadron and Magic mouthwash. She will be advised to follow-up with her primary care physician in 2 to 3 days. Return precautions have been given. Disposition Considerations (tests considered but not done, Admit vs D/C, Shared Decision Making, Pt Expectation of Test or Tx.): None         Critical Care Time:     0    Nubia Lauren MD    Diagnosis and Disposition     I Nelsy Parry MD am the primary clinician of record. DIAGNOSIS: No diagnosis found.     DISPOSITION        PATIENT REFERRED TO:  No follow-up provider specified. DISCHARGE MEDICATIONS:  New Prescriptions    GABAPENTIN (NEURONTIN) 300 MG CAPSULE    Take 1 capsule by mouth nightly for 5 days. Start evening of surgery x 5 days Max Daily Amount: 300 mg       DISCONTINUED MEDICATIONS:  Discontinued Medications    No medications on file              (Please note that portions of this note were completed with a voice recognition program.  Efforts were made to edit the dictations but occasionally words are mis-transcribed.)    Olivia Carballo MD (electronically signed)        Dragon Disclaimer     Please note that this dictation was completed with [id number], the computer voice recognition software. Quite often unanticipated grammatical, syntax, homophones, and other interpretive errors are inadvertently transcribed by the computer software. Please disregard these errors. Please excuse any errors that have escaped final proofreading.

## 2023-02-22 NOTE — ED NOTES
Assumed care of patient. Sitting up on stretcher, in no distress. On monitor with family member at bedside. Call bell within reach. Awaiting MD evaluation. Will continue to monitor closely while awaiting further orders.       Calixto Morris RN  02/21/23 9032

## 2023-02-23 ENCOUNTER — APPOINTMENT (OUTPATIENT)
Facility: HOSPITAL | Age: 53
End: 2023-02-23
Payer: COMMERCIAL

## 2023-02-27 ENCOUNTER — OFFICE VISIT (OUTPATIENT)
Age: 53
End: 2023-02-27

## 2023-02-27 VITALS — WEIGHT: 170 LBS | BODY MASS INDEX: 29.02 KG/M2 | HEIGHT: 64 IN

## 2023-02-27 DIAGNOSIS — M75.111 INCOMPLETE ROTATOR CUFF TEAR OR RUPTURE OF RIGHT SHOULDER, NOT SPECIFIED AS TRAUMATIC: Primary | ICD-10-CM

## 2023-02-27 PROCEDURE — 99024 POSTOP FOLLOW-UP VISIT: CPT | Performed by: PHYSICIAN ASSISTANT

## 2023-02-27 RX ORDER — OXYCODONE HYDROCHLORIDE AND ACETAMINOPHEN 5; 325 MG/1; MG/1
1 TABLET ORAL EVERY 4 HOURS PRN
COMMUNITY
End: 2023-02-27 | Stop reason: CLARIF

## 2023-02-27 RX ORDER — SODIUM PHOSPHATE, DIBASIC AND SODIUM PHOSPHATE, MONOBASIC 7; 19 G/133ML; G/133ML
1 ENEMA RECTAL
Qty: 1 EACH | Refills: 2 | COMMUNITY
Start: 2023-02-27 | End: 2023-02-27

## 2023-02-27 RX ORDER — OXYCODONE HYDROCHLORIDE 5 MG/1
5 CAPSULE ORAL EVERY 4 HOURS PRN
COMMUNITY

## 2023-02-27 NOTE — PROGRESS NOTES
Ady Piña  1970     HISTORY OF PRESENT ILLNESS  Ady Piña is a 50 y.o. female who presents today for evaluation s/p Right shoulder arthroscopic rotator cuff repair on 2/20/2023. Patient has been going to PT once per week. Describes pain as a 5/10. Has been taking Roxicodone for pain. Still has night pain. Went to the ED on 2/21/2023 with hemoptysis. Her throat discomfort has since improved but she is having some nasal discomfort. She does note some constipation today, has taken Miralax the past 2 days and Dulcolax. Presents today wearing a sling. Patient denies any fever, chills, chest pain, shortness of breath or calf pain. The remainder of the review of systems is negative. There are no new illness or injuries to report since last seen in the office. No changes in medications, allergies, social or family history. PHYSICAL EXAM:   Ht 5' 4\" (1.626 m)   Wt 170 lb (77.1 kg)   BMI 29.18 kg/m²    The patient is a well-developed, well-nourished female in no acute distress. The patient is alert and oriented times three. The patient appears to be well groomed. Mood and affect are normal.  ORTHOPEDIC EXAM of right shoulder:  Inspection: swelling not present,  Bruising present  Incision, clean, dry, intact, sutures in place  Passive glenohumeral abduction 0-45 degrees  Stability: Stable  Strength: n/a  2+ distal pulses    IMAGING: None    IMPRESSION:  S/P right shoulder arthroscopic rotator cuff repair  Encounter Diagnosis   Name Primary? Incomplete rotator cuff tear or rupture of right shoulder, not specified as traumatic Yes         PLAN: Overall doing well post-operatively. Incisions cleaned. Surgery was discussed at length today. Patient to continue with PROM and PT. Continue wearing sling. Stressed to patient that nothing causes an increase in pain. She presents with some constipation in the office today, was instructed to use Miralax BID and will also prescribe Fleet enema. Okay to start scar massage for desensitization. Was instructed to follow up with PCP regarding nasal discomfort and pulmonary nodules seen on chest CTA taken at the ED.     RTC 4 weeks    Scribed by Jean Anthony) as dictated by SANTOSH Baltazar PA-C [phone]

## 2023-02-28 ENCOUNTER — HOSPITAL ENCOUNTER (OUTPATIENT)
Facility: HOSPITAL | Age: 53
Setting detail: RECURRING SERIES
End: 2023-02-28
Payer: COMMERCIAL

## 2023-02-28 ENCOUNTER — TELEPHONE (OUTPATIENT)
Age: 53
End: 2023-02-28

## 2023-02-28 RX ORDER — SODIUM PHOSPHATE, DIBASIC AND SODIUM PHOSPHATE, MONOBASIC 7; 19 G/133ML; G/133ML
1 ENEMA RECTAL
Qty: 1 EACH | Refills: 2 | Status: SHIPPED | OUTPATIENT
Start: 2023-02-28 | End: 2023-02-28

## 2023-03-02 ENCOUNTER — HOSPITAL ENCOUNTER (OUTPATIENT)
Facility: HOSPITAL | Age: 53
Setting detail: RECURRING SERIES
Discharge: HOME | End: 2023-03-05
Payer: COMMERCIAL

## 2023-03-02 PROCEDURE — 97110 THERAPEUTIC EXERCISES: CPT

## 2023-03-02 PROCEDURE — 97535 SELF CARE MNGMENT TRAINING: CPT

## 2023-03-02 PROCEDURE — 97140 MANUAL THERAPY 1/> REGIONS: CPT

## 2023-03-02 NOTE — PROGRESS NOTES
PHYSICAL / OCCUPATIONAL THERAPY - DAILY TREATMENT NOTE (updated )    Patient Name: Frankie Rivas    Date: 3/2/2023    : 1970  Insurance: Payor: Saintclair Patel / Plan: [address] [place] [address] / Product Type: *No Product type* /      Patient  verified Yes     Visit #   Current / Total 1 12   Time   In / Out 1:32 2:20   Pain   In / Out 7 2   Subjective Functional Status/Changes: The pt reports she has been unable to attend PT as of yet due to illness. Changes to:  Meds, Allergies, Med Hx, Sx Hx? If yes, update Summary List no       TREATMENT AREA =  No admission diagnoses are documented for this encounter. OBJECTIVE    Modalities Rationale:     decrease inflammation and decrease pain to improve patient's ability to progress to PLOF and address remaining functional goals. min [] Estim Unattended, type/location:                                      []  w/ice    []  w/heat    min [] Estim Attended, type/location:                                     []  w/US     []  w/ice    []  w/heat    []  TENS insruct      min []  Mechanical Traction: type/lbs                   []  pro   []  sup   []  int   []  cont    []  before manual    []  after manual    min []  Ultrasound, settings/location:      min []  Iontophoresis w/ dexamethasone, location:                                               []  take home patch       []  in clinic    min  unbill []  Ice     []  Heat    location/position:     min []  Paraffin,  details:    10 min [x]  Vasopneumatic Device, press/temp: Low low    min []  Kolby Mitchell / Syeda Minor: If using vaso (only need to measure limb vaso being performed on)      pre-treatment girth :       post-treatment girth :       measured at (landmark location) :      min []  Other:    Skin assessment post-treatment (if applicable):    []  intact    []  redness- no adverse reaction                 []redness - adverse reaction:         Therapeutic Procedures:     Tx Min Billable or 1:1 Min (if diff from Cony) Procedure, Rationale, Specifics   15  69101 Therapeutic Exercise (timed):  increase ROM, strength, coordination, balance, and proprioception to improve patient's ability to progress to PLOF and address remaining functional goals. (see flow sheet as applicable)     Details if applicable:       15  05368 Manual Therapy (timed):  decrease pain, increase ROM, and increase tissue extensibility to improve patient's ability to progress to PLOF and address remaining functional goals. The manual therapy interventions were performed at a separate and distinct time from the therapeutic activities interventions . (see flow sheet as applicable)     Details if applicable:     8  23091 Self Care/Home Management (timed):  improve patient knowledge and understanding of pain reducing techniques, activity modification, diagnosis/prognosis, and physical therapy expectations, procedures and progression  to improve patient's ability to progress to PLOF and address remaining functional goals. (see flow sheet as applicable)     Details if applicable:            Details if applicable:            Details if applicable:     45  [facility] Totals Reminder: bill using total billable min of TIMED therapeutic procedures (example: do not include dry needle or estim unattended, both untimed codes, in totals to left)  8-22 min = 1 unit; 23-37 min = 2 units; 38-52 min = 3 units; 53-67 min = 4 units; 68-82 min = 5 units   Total Total     [x]  Patient Education billed concurrently with other procedures   [x] Review HEP    [] Progressed/Changed HEP, detail:    [] Other detail:       Objective Information/Functional Measures/Assessment    The pt returns for first follow up since PT eval due to illness. The pt required cues with PROM flexion/scaption due to muscle guarding. PROM is showing progress but is of concern due to pt reporting she is only able to attend PT 1x/week.      Patient will continue to benefit from skilled PT / OT services to modify and progress therapeutic interventions, analyze and address functional mobility deficits, analyze and address ROM deficits, analyze and address strength deficits, analyze and address soft tissue restrictions, analyze and cue for proper movement patterns, and analyze and modify for postural abnormalities to address functional deficits and attain remaining goals. Progress toward goals / Updated goals:  []  See Progress Note/Recertification    Short Term Goals: To be accomplished in 1 weeks  The pt will be I and compliant with HEP  IE- issued HEP   Current: MET goal on 3-2-23  Long Term Goals: To be accomplished in 6weeks  Improve FOTO score to the predicted outcome for improved ability for ADL              IE-  2. Improve PROM right shoulder scaption to 130 degrees for improved ability for ADL              IE- 10 degrees  3. Improve PROM right shoulder ER in the scapular plane to 45 degrees for improved ability for functional tasks. IE- lacking 10 degrees from neutral   Current: progressing 10 degrees on 3-2-23  4.  The pt will demonstrate full AROM of the right elbow for improved ability for Adl              IE- lacking 20 degrees of extension, 100 degrees of flexion   Current: progressing AAROM full  on 3-2-23       PLAN  Yes  Continue plan of care  []  Upgrade activities as tolerated  []  Discharge due to :  []  Other:    Bridger Alas PT    3/2/2023    1:48 PM    Future Appointments   Date Time Provider Concetta Mcghee   3/9/2023  1:00 PM [facility]   3/16/2023  1:00 PM Brid[facility]   3/27/2023  1:15 PM ARIEL Dominguez [facility]

## 2023-03-07 ENCOUNTER — APPOINTMENT (OUTPATIENT)
Facility: HOSPITAL | Age: 53
End: 2023-03-07
Payer: COMMERCIAL

## 2023-03-09 ENCOUNTER — HOSPITAL ENCOUNTER (OUTPATIENT)
Facility: HOSPITAL | Age: 53
Setting detail: RECURRING SERIES
Discharge: HOME | End: 2023-03-12
Payer: COMMERCIAL

## 2023-03-09 PROCEDURE — 97140 MANUAL THERAPY 1/> REGIONS: CPT

## 2023-03-09 PROCEDURE — 97110 THERAPEUTIC EXERCISES: CPT

## 2023-03-09 PROCEDURE — 97535 SELF CARE MNGMENT TRAINING: CPT

## 2023-03-09 NOTE — PROGRESS NOTES
PHYSICAL / OCCUPATIONAL THERAPY - DAILY TREATMENT NOTE (updated )    Patient Name: Eugenia Garcia    Date: 3/9/2023    : 1970  Insurance: Payor: June Ray / Plan: [address] [place] [address] / Product Type: *No Product type* /      Patient  verified Yes     Visit #   Current / Total 3 12   Time   In / Out 1:00 1:40   Pain   In / Out 5-6 4   Subjective Functional Status/Changes: The pt reports she was told by the ortho she could take the sling off some. She states she has been coming out of the sling. The pt reports pain increase later after last session despite feeling better initially. Changes to:  Meds, Allergies, Med Hx, Sx Hx? If yes, update Summary List no       TREATMENT AREA =  Right shoulder pain    OBJECTIVE    Modalities Rationale:     decrease inflammation and decrease pain to improve patient's ability to progress to PLOF and address remaining functional goals. min [] Estim Unattended, type/location:                                      []  w/ice    []  w/heat    min [] Estim Attended, type/location:                                     []  w/US     []  w/ice    []  w/heat    []  TENS insruct      min []  Mechanical Traction: type/lbs                   []  pro   []  sup   []  int   []  cont    []  before manual    []  after manual    min []  Ultrasound, settings/location:      min []  Iontophoresis w/ dexamethasone, location:                                               []  take home patch       []  in clinic    min  unbill []  Ice     []  Heat    location/position:     min []  Paraffin,  details:    10 min [x]  Vasopneumatic Device, press/temp: Low low    min []  Ebonie Levy / Olivier Cao:     If using vaso (only need to measure limb vaso being performed on)      pre-treatment girth :       post-treatment girth :       measured at (landmark location) :      min []  Other:    Skin assessment post-treatment (if applicable):    []  intact    []  redness- no adverse reaction                 []redness - adverse reaction:         Therapeutic Procedures: Tx Min Billable or 1:1 Min (if diff from Tx Min) Procedure, Rationale, Specifics   15  02609 Therapeutic Exercise (timed):  increase ROM, strength, coordination, balance, and proprioception to improve patient's ability to progress to PLOF and address remaining functional goals. (see flow sheet as applicable)     Details if applicable:       15  23867 Manual Therapy (timed):  decrease pain, increase ROM, and increase tissue extensibility to improve patient's ability to progress to PLOF and address remaining functional goals. The manual therapy interventions were performed at a separate and distinct time from the therapeutic activities interventions . (see flow sheet as applicable)     Details if applicable:     8  87856 Self Care/Home Management (timed):  improve patient knowledge and understanding of pain reducing techniques, activity modification, diagnosis/prognosis, and physical therapy expectations, procedures and progression  to improve patient's ability to progress to PLOF and address remaining functional goals. (see flow sheet as applicable)     Details if applicable:            Details if applicable:            Details if applicable:     [address] [facility] Totals Reminder: bill using total billable min of TIMED therapeutic procedures (example: do not include dry needle or estim unattended, both untimed codes, in totals to left)  8-22 min = 1 unit; 23-37 min = 2 units; 38-52 min = 3 units; 53-67 min = 4 units; 68-82 min = 5 units   Total Total     [x]  Patient Education billed concurrently with other procedures   [x] Review HEP    [] Progressed/Changed HEP, detail:    [] Other detail:       Objective Information/Functional Measures/Assessment    The pt reports her pain was decreased following last session but increased later. She has been out of her sling some but reports ortho cleared her to do so.  She does report trying to lift her arm but surgical precautions were reviewed. Improved PROM flexion today but progress is slow. This is likely due to pt's limited ability to attend due to financial concerns. Patient will continue to benefit from skilled PT / OT services to modify and progress therapeutic interventions, analyze and address functional mobility deficits, analyze and address ROM deficits, analyze and address strength deficits, analyze and address soft tissue restrictions, analyze and cue for proper movement patterns, and analyze and modify for postural abnormalities to address functional deficits and attain remaining goals. Progress toward goals / Updated goals:  []  See Progress Note/Recertification    Short Term Goals: To be accomplished in 1 weeks  The pt will be I and compliant with HEP  IE- issued HEP   Current: MET goal on 3-2-23  Long Term Goals: To be accomplished in 6weeks  Improve FOTO score to the predicted outcome for improved ability for ADL              IE-  2. Improve PROM right shoulder scaption to 130 degrees for improved ability for ADL              IE- 10 degrees   Current: 50 degrees 3-9-23  3. Improve PROM right shoulder ER in the scapular plane to 45 degrees for improved ability for functional tasks. IE- lacking 10 degrees from neutral   Current: progressing 15 degrees on 3-9-23  4.  The pt will demonstrate full AROM of the right elbow for improved ability for Adl              IE- lacking 20 degrees of extension, 100 degrees of flexion   Current: progressing AAROM full  on 3-2-23       PLAN  Yes  Continue plan of care  []  Upgrade activities as tolerated  []  Discharge due to :  []  Other:    Asher Bee PT    3/9/2023    1:06 PM    Future Appointments   Date Time Provider Concetta Mcghee   3/16/2023  [facility]   3/27/2023  1:15 PM ARIEL Elizabeth [facility]

## 2023-03-14 ENCOUNTER — APPOINTMENT (OUTPATIENT)
Facility: HOSPITAL | Age: 53
End: 2023-03-14
Payer: COMMERCIAL

## 2023-03-16 ENCOUNTER — HOSPITAL ENCOUNTER (OUTPATIENT)
Facility: HOSPITAL | Age: 53
Setting detail: RECURRING SERIES
Discharge: HOME | End: 2023-03-19
Payer: COMMERCIAL

## 2023-03-16 PROCEDURE — 97110 THERAPEUTIC EXERCISES: CPT

## 2023-03-16 PROCEDURE — 97140 MANUAL THERAPY 1/> REGIONS: CPT

## 2023-03-16 NOTE — PROGRESS NOTES
PHYSICAL / OCCUPATIONAL THERAPY - DAILY TREATMENT NOTE (updated )    Patient Name: Marisela Rg    Date: 3/16/2023    : 1970  Insurance: Payor: [employer] / Plan: [address] [place] [address] / Product Type: *No Product type* /      Patient  verified Yes     Visit #   Current / Total 4 12   Time   In / Out 1:00 1:40   Pain   In / Out 8 5-6   Subjective Functional Status/Changes: The pt reports pain increase over the last couple of days. She reports she has been taking pain meds. Changes to:  Meds, Allergies, Med Hx, Sx Hx? If yes, update Summary List no       TREATMENT AREA =  Right shoulder pain    OBJECTIVE    Modalities Rationale:     decrease inflammation and decrease pain to improve patient's ability to progress to PLOF and address remaining functional goals. min [] Estim Unattended, type/location:                                      []  w/ice    []  w/heat    min [] Estim Attended, type/location:                                     []  w/US     []  w/ice    []  w/heat    []  TENS insruct      min []  Mechanical Traction: type/lbs                   []  pro   []  sup   []  int   []  cont    []  before manual    []  after manual    min []  Ultrasound, settings/location:      min []  Iontophoresis w/ dexamethasone, location:                                               []  take home patch       []  in clinic    min  unbill []  Ice     []  Heat    location/position:     min []  Paraffin,  details:    10 min [x]  Vasopneumatic Device, press/temp: Low low    min []  Jacques Shipley / Eddi Ready: If using vaso (only need to measure limb vaso being performed on)      pre-treatment girth :       post-treatment girth :       measured at (landmark location) :      min []  Other:    Skin assessment post-treatment (if applicable):    []  intact    []  redness- no adverse reaction                 []redness - adverse reaction:         Therapeutic Procedures:     Tx Min Billable or 1:1 Min (if diff from [employer]) Procedure,

## 2023-03-23 ENCOUNTER — APPOINTMENT (OUTPATIENT)
Facility: HOSPITAL | Age: 53
End: 2023-03-23
Payer: COMMERCIAL

## 2023-03-27 ENCOUNTER — OFFICE VISIT (OUTPATIENT)
Age: 53
End: 2023-03-27

## 2023-03-27 VITALS — BODY MASS INDEX: 29.02 KG/M2 | WEIGHT: 170 LBS | HEIGHT: 64 IN

## 2023-03-27 DIAGNOSIS — M75.111 INCOMPLETE ROTATOR CUFF TEAR OR RUPTURE OF RIGHT SHOULDER, NOT SPECIFIED AS TRAUMATIC: Primary | ICD-10-CM

## 2023-03-27 PROCEDURE — 99024 POSTOP FOLLOW-UP VISIT: CPT | Performed by: PHYSICIAN ASSISTANT

## 2023-03-27 RX ORDER — OXYCODONE HYDROCHLORIDE 5 MG/1
5 TABLET ORAL EVERY 6 HOURS PRN
Qty: 28 TABLET | Refills: 0 | Status: SHIPPED | OUTPATIENT
Start: 2023-03-27 | End: 2023-04-03

## 2023-03-27 RX ORDER — CELECOXIB 200 MG/1
200 CAPSULE ORAL DAILY
Qty: 60 CAPSULE | Refills: 3 | Status: SHIPPED | OUTPATIENT
Start: 2023-03-27

## 2023-03-27 RX ORDER — GABAPENTIN 100 MG/1
100 CAPSULE ORAL 3 TIMES DAILY
Qty: 90 CAPSULE | Refills: 1 | Status: SHIPPED | OUTPATIENT
Start: 2023-03-27 | End: 2023-09-23

## 2023-03-27 NOTE — PROGRESS NOTES
Angelica Reese  1970     HISTORY OF PRESENT ILLNESS  Angelica Reese is a 50 y.o. female who presents today for evaluation s/p Right shoulder arthroscopic rotator cuff repair on 2/20/2023. Patient has been going to PT once per week. Describes pain as a 5/10. Presents today wearing a sling. Still in quite a bit of pain. Patient denies any fever, chills, chest pain, shortness of breath or calf pain. The remainder of the review of systems is negative. There are no new illness or injuries to report since last seen in the office. No changes in medications, allergies, social or family history. PHYSICAL EXAM:   Ht 5' 4\" (1.626 m)   Wt 170 lb (77.1 kg)   BMI 29.18 kg/m²    The patient is a well-developed, well-nourished female in no acute distress. The patient is alert and oriented times three. The patient appears to be well groomed. Mood and affect are normal.  ORTHOPEDIC EXAM of right shoulder:  Inspection: swelling not present,  Bruising not present  Incisions well healed  Passive glenohumeral abduction 0-60 degrees  Stability: Stable  Strength: n/a  2+ distal pulses    IMAGING: None    IMPRESSION:  S/P right shoulder arthroscopic rotator cuff repair  Encounter Diagnosis   Name Primary? Incomplete rotator cuff tear or rupture of right shoulder, not specified as traumatic Yes         PLAN:   Patient with some improvement post operatively  Will cont with PT. [id number] Sia Riley for arom now. Ok to d/c sling stressed no increases in pain  3. Rx for celebrex, gabapentin and roxicodone today. Patient given pain medication for short term acute pain relief. Goal is to treat patient according to above plan and to ultimately have patient off all pain medications once appropriate. If chronic pain management is required beyond what is expected for current orthopedic problem, will refer patient to pain management.   was reviewed and will be reviewed with every medication refill request.     RTC 2 weeks     Jimena Martin

## 2023-03-27 NOTE — LETTER
March 27, 2023       [place] YOB: 1970   [address] [facility] Date of Visit:  3/27/2023       To Whom It May Concern:    Edwin Bettencourt was seen in my clinic on 3/27/2023. Patient's return to work date is still to be determined. Goal is the beginning of June to return. If you have any questions or concerns, please don't hesitate to call.     Sincerely,        ARIEL Vargas

## 2023-03-27 NOTE — PATIENT INSTRUCTIONS
You may remove your sling now.   You may begin to move your arm on your own .   Continue with no lifting, pushing or pulling anything heavier than 5lbs until you are seen again in 6 weeks.    Remember nothing causes an increase in pain including physical therapy.

## 2023-03-30 ENCOUNTER — HOSPITAL ENCOUNTER (OUTPATIENT)
Facility: HOSPITAL | Age: 53
Setting detail: RECURRING SERIES
End: 2023-03-30
Payer: COMMERCIAL

## 2023-03-30 PROCEDURE — 97110 THERAPEUTIC EXERCISES: CPT

## 2023-03-30 PROCEDURE — 97140 MANUAL THERAPY 1/> REGIONS: CPT

## 2023-03-30 NOTE — PROGRESS NOTES
PHYSICAL / OCCUPATIONAL THERAPY - DAILY TREATMENT NOTE (updated )    Patient Name: Javier Drew    Date: 3/30/2023    : 1970  Insurance: Payor: Lilo Una / Plan: [address] [place] [address] / Product Type: *No Product type* /      Patient  verified Yes     Visit #   Current / Total 5 12   Time   In / Out 1:03 1:45   Pain   In / Out 6 4-5   Subjective Functional Status/Changes: The pt reports pain remains in the right shoulder. Changes to:  Meds, Allergies, Med Hx, Sx Hx? If yes, update Summary List no       TREATMENT AREA =  Right shoulder pain    OBJECTIVE    Modalities Rationale:     decrease inflammation and decrease pain to improve patient's ability to progress to PLOF and address remaining functional goals. min [] Estim Unattended, type/location:                                      []  w/ice    []  w/heat    min [] Estim Attended, type/location:                                     []  w/US     []  w/ice    []  w/heat    []  TENS insruct      min []  Mechanical Traction: type/lbs                   []  pro   []  sup   []  int   []  cont    []  before manual    []  after manual    min []  Ultrasound, settings/location:      min []  Iontophoresis w/ dexamethasone, location:                                               []  take home patch       []  in clinic    min  unbill []  Ice     []  Heat    location/position:     min []  Paraffin,  details:    10 min [x]  Vasopneumatic Device, press/temp: Low low    min []  Maxcine Lyme / Orestes Gulling: If using vaso (only need to measure limb vaso being performed on)      pre-treatment girth :       post-treatment girth :       measured at (landmark location) :      min []  Other:    Skin assessment post-treatment (if applicable):    []  intact    []  redness- no adverse reaction                 []redness - adverse reaction:         Therapeutic Procedures:     Tx Min Billable or 1:1 Min (if diff from Tx Min) Procedure, Rationale, Specifics   17  47173 Therapeutic movement patterns, and analyze and modify for postural abnormalities to address functional deficits and attain remaining goals. Progress toward goals / Updated goals:  []  See Progress Note/Recertification    Short Term Goals: To be accomplished in 1 weeks  The pt will be I and compliant with HEP  IE- issued HEP   Current: MET goal on 3-2-23  Long Term Goals: To be accomplished in 6weeks  Improve FOTO score to the predicted outcome for improved ability for ADL              IE-4  2. Improve PROM right shoulder scaption to 130 degrees for improved ability for ADL              IE- 10 degrees   Current: 50 degrees 3-16-23  3. Improve PROM right shoulder ER in the scapular plane to 45 degrees for improved ability for functional tasks. IE- lacking 10 degrees from neutral   Current: progressing 15 degrees on 3-9-23, regressed to neutral 3-16-23, progressed to 4 degrees on 3-30-23  4.  The pt will demonstrate full AROM of the right elbow for improved ability for Adl              IE- lacking 20 degrees of extension, 100 degrees of flexion   Current: progressing AAROM full  on 3-2-23       PLAN  Yes  Continue plan of care  []  Upgrade activities as tolerated  []  Discharge due to :  []  Other:    Umm Vargas PT    3/30/2023    2:43 PM    Future Appointments   Date Time Provider Concetta Mcghee   4/6/2023  7:30 AM [facility]   4/10/2023  1:45 [facility]   4/13/2023  9:00 AM LONNY Hollingsworth

## 2023-03-31 ENCOUNTER — CLINICAL DOCUMENTATION (OUTPATIENT)
Age: 53
End: 2023-03-31

## 2023-03-31 NOTE — PROGRESS NOTES
Patient came in and dropped off Guardian paperwork for Disability.  It was scanned into her chart and placed in the forms bin at [facility] on 3/31/23

## 2023-04-13 ENCOUNTER — APPOINTMENT (OUTPATIENT)
Facility: HOSPITAL | Age: 53
End: 2023-04-13
Payer: COMMERCIAL

## 2023-04-13 ENCOUNTER — HOSPITAL ENCOUNTER (OUTPATIENT)
Facility: HOSPITAL | Age: 53
Discharge: HOME | End: 2023-04-16
Payer: COMMERCIAL

## 2023-04-13 DIAGNOSIS — M75.111 INCOMPLETE ROTATOR CUFF TEAR OR RUPTURE OF RIGHT SHOULDER, NOT SPECIFIED AS TRAUMATIC: ICD-10-CM

## 2023-04-13 PROCEDURE — 23350 INJECTION FOR SHOULDER X-RAY: CPT

## 2023-04-13 PROCEDURE — A4216 STERILE WATER/SALINE, 10 ML: HCPCS | Performed by: PHYSICIAN ASSISTANT

## 2023-04-13 PROCEDURE — 2500000003 HC RX 250 WO HCPCS: Performed by: PHYSICIAN ASSISTANT

## 2023-04-13 PROCEDURE — A9577 INJ MULTIHANCE: HCPCS | Performed by: PHYSICIAN ASSISTANT

## 2023-04-13 PROCEDURE — 73222 MRI JOINT UPR EXTREM W/DYE: CPT

## 2023-04-13 PROCEDURE — 6360000004 HC RX CONTRAST MEDICATION: Performed by: PHYSICIAN ASSISTANT

## 2023-04-13 PROCEDURE — 2580000003 HC RX 258: Performed by: PHYSICIAN ASSISTANT

## 2023-04-13 RX ORDER — LIDOCAINE HYDROCHLORIDE 10 MG/ML
10 INJECTION, SOLUTION EPIDURAL; INFILTRATION; INTRACAUDAL; PERINEURAL ONCE
Status: COMPLETED | OUTPATIENT
Start: 2023-04-13 | End: 2023-04-13

## 2023-04-13 RX ORDER — SODIUM CHLORIDE 9 MG/ML
10 INJECTION INTRAVENOUS ONCE
Status: COMPLETED | OUTPATIENT
Start: 2023-04-13 | End: 2023-04-13

## 2023-04-13 RX ADMIN — GADOBENATE DIMEGLUMINE 0.15 ML: 529 INJECTION, SOLUTION INTRAVENOUS at 09:54

## 2023-04-13 RX ADMIN — SODIUM CHLORIDE 10 ML: 9 INJECTION, SOLUTION INTRAMUSCULAR; INTRAVENOUS; SUBCUTANEOUS at 09:55

## 2023-04-13 RX ADMIN — LIDOCAINE HYDROCHLORIDE 10 ML: 10 INJECTION, SOLUTION EPIDURAL; INFILTRATION; INTRACAUDAL; PERINEURAL at 09:55

## 2023-04-13 RX ADMIN — IOPAMIDOL 20 ML: 408 INJECTION, SOLUTION INTRATHECAL at 09:55

## 2023-04-20 ENCOUNTER — OFFICE VISIT (OUTPATIENT)
Age: 53
End: 2023-04-20

## 2023-04-20 VITALS — WEIGHT: 170 LBS | BODY MASS INDEX: 29.02 KG/M2 | HEIGHT: 64 IN

## 2023-04-20 DIAGNOSIS — M75.01 ADHESIVE CAPSULITIS OF RIGHT SHOULDER: ICD-10-CM

## 2023-04-20 DIAGNOSIS — M75.111 INCOMPLETE ROTATOR CUFF TEAR OR RUPTURE OF RIGHT SHOULDER, NOT SPECIFIED AS TRAUMATIC: Primary | ICD-10-CM

## 2023-04-20 PROCEDURE — 99024 POSTOP FOLLOW-UP VISIT: CPT | Performed by: ORTHOPAEDIC SURGERY

## 2023-04-20 NOTE — PROGRESS NOTES
Arlyn Baeza  1970   Chief Complaint   Patient presents with    Results     Rt shoulder mri     Post-Op Check     Rt shoulder         HISTORY OF PRESENT ILLNESS  Arlyn Baeza is a 50 y.o. female who presents today for reevaluation of right shoulder and MRI review. Pain is a 6/10. She is s/p Right shoulder arthroscopic rotator cuff repair on 2/20/2023. She experienced increase in pain after attending her first PT session postoperatively. Patient has been going to PT once per week. She has also been doing physician-directed home exercise program. Patient states pain is is about the same today. She also notes new shoulder blade pain which started in the last 3 weeks. Patient denies any fever, chills, chest pain, shortness of breath or calf pain. The remainder of the review of systems is negative. There are no new illness or injuries to report since last seen in the office. No changes in medications, allergies, social or family history. PHYSICAL EXAM:   Ht 5' 4\" (1.626 m)   Wt 170 lb (77.1 kg)   BMI 29.18 kg/m²   The patient is a well-developed, well-nourished female   in no acute distress. The patient is alert and oriented times three. The patient is alert and oriented times three. Mood and affect are normal.  LYMPHATIC: lymph nodes are not enlarged and are within normal limits  SKIN: normal in color and non tender to palpation. There are no bruises or abrasions noted. NEUROLOGICAL: Motor sensory exam is within normal limits. Reflexes are equal bilaterally. There is normal sensation to pinprick and light touch  ORTHOPEDIC EXAM of right shoulder:  Inspection: swelling not present,  Bruising not present  Incisions well healed  Passive glenohumeral abduction 0-60 degrees  Stability: Stable  Strength: n/a  2+ distal pulses      IMAGING: MRI arthrogram of right shoulder dated 4/13/2023 reviewed and read by Dr. Dena Sykes:   IMPRESSION:  Limited study due to motion.    1. No rotator cuff tendon

## 2023-04-22 DIAGNOSIS — F41.9 ANXIETY DISORDER, UNSPECIFIED: ICD-10-CM

## 2023-04-22 DIAGNOSIS — M75.101 UNSPECIFIED ROTATOR CUFF TEAR OR RUPTURE OF RIGHT SHOULDER, NOT SPECIFIED AS TRAUMATIC: ICD-10-CM

## 2023-04-24 ENCOUNTER — TELEPHONE (OUTPATIENT)
Age: 53
End: 2023-04-24

## 2023-04-24 RX ORDER — DICLOFENAC SODIUM 75 MG/1
TABLET, DELAYED RELEASE ORAL
Qty: 60 TABLET | Refills: 2 | Status: SHIPPED | OUTPATIENT
Start: 2023-04-24

## 2023-04-25 ENCOUNTER — TELEPHONE (OUTPATIENT)
Age: 53
End: 2023-04-25

## 2023-05-03 ENCOUNTER — CLINICAL DOCUMENTATION (OUTPATIENT)
Age: 53
End: 2023-05-03

## 2023-05-04 ENCOUNTER — OFFICE VISIT (OUTPATIENT)
Age: 53
End: 2023-05-04

## 2023-05-04 VITALS — BODY MASS INDEX: 29.53 KG/M2 | HEIGHT: 64 IN | WEIGHT: 173 LBS

## 2023-05-04 DIAGNOSIS — M75.111 INCOMPLETE ROTATOR CUFF TEAR OR RUPTURE OF RIGHT SHOULDER, NOT SPECIFIED AS TRAUMATIC: Primary | ICD-10-CM

## 2023-05-04 DIAGNOSIS — M75.01 ADHESIVE CAPSULITIS OF RIGHT SHOULDER: ICD-10-CM

## 2023-05-04 PROCEDURE — 99024 POSTOP FOLLOW-UP VISIT: CPT | Performed by: ORTHOPAEDIC SURGERY

## 2023-05-04 NOTE — PROGRESS NOTES
Afia Rendon  1970   Chief Complaint   Patient presents with    Shoulder Pain     Rt         HISTORY OF PRESENT ILLNESS  Afia Rendon is a 50 y.o. female who presents today for reevaluation of right shoulder. Pain is a 5/10. She is s/p Right shoulder arthroscopic rotator cuff repair on 2/20/2023. She experienced increase in pain after attending her first PT session postoperatively. Patient had been going to PT once per week. Patient states pain is is about the same today. Was instructed to hold on PT at last OV and focus on home exercises. She states PT was exacerbating her pain. Patient denies any fever, chills, chest pain, shortness of breath or calf pain. The remainder of the review of systems is negative. There are no new illness or injuries to report since last seen in the office. No changes in medications, allergies, social or family history. PHYSICAL EXAM:   Ht 5' 4\" (1.626 m)   Wt 173 lb (78.5 kg)   BMI 29.70 kg/m²   The patient is a well-developed, well-nourished female   in no acute distress. The patient is alert and oriented times three. The patient is alert and oriented times three. Mood and affect are normal.  LYMPHATIC: lymph nodes are not enlarged and are within normal limits  SKIN: normal in color and non tender to palpation. There are no bruises or abrasions noted. NEUROLOGICAL: Motor sensory exam is within normal limits. Reflexes are equal bilaterally. There is normal sensation to pinprick and light touch  ORTHOPEDIC EXAM of right shoulder:  Inspection: swelling not present,  Bruising not present  Incisions well healed  Passive glenohumeral abduction 0-60 degrees  Stability: Stable  Strength: n/a  2+ distal pulses      IMAGING: MRI arthrogram of right shoulder dated 4/13/2023 reviewed and read by Dr. Christian Mathews:   IMPRESSION:  Limited study due to motion. 1. No rotator cuff tendon full-thickness tear or rupture. No contrast   surrounding the anchor sutures.    2.

## 2023-05-09 ENCOUNTER — TELEPHONE (OUTPATIENT)
Age: 53
End: 2023-05-09

## 2023-06-08 ENCOUNTER — OFFICE VISIT (OUTPATIENT)
Age: 53
End: 2023-06-08

## 2023-06-08 VITALS — WEIGHT: 169 LBS | BODY MASS INDEX: 28.85 KG/M2 | HEIGHT: 64 IN

## 2023-06-08 DIAGNOSIS — M75.111 INCOMPLETE ROTATOR CUFF TEAR OR RUPTURE OF RIGHT SHOULDER, NOT SPECIFIED AS TRAUMATIC: Primary | ICD-10-CM

## 2023-06-08 DIAGNOSIS — M75.01 ADHESIVE CAPSULITIS OF RIGHT SHOULDER: ICD-10-CM

## 2023-06-08 NOTE — PROGRESS NOTES
Ector Holman  1970   Chief Complaint   Patient presents with    Follow Up After Procedure     Rt RCR SX 2/20/23        HISTORY OF PRESENT ILLNESS  Ector Holman is a 50 y.o. female who presents today for reevaluation of right shoulder. Pain is a 3/10. She is s/p Right shoulder arthroscopic rotator cuff repair on 2/20/2023. She experienced increase in pain after attending her first PT session postoperatively. Patient had been going to PT once per week. She had been previously instructed to hold on PT and focus on home exercises. She has been completing physician-directed home exercise program and feels like she is improving. Feels like her ROM is improving. Her pain has overall improved over the last month. She feels like she is taking less pain medication, only takes Tylenol on occasion as needed. Patient denies any fever, chills, chest pain, shortness of breath or calf pain. The remainder of the review of systems is negative. There are no new illness or injuries to report since last seen in the office. No changes in medications, allergies, social or family history. PHYSICAL EXAM:   Ht 5' 4\" (1.626 m)   Wt 169 lb (76.7 kg)   BMI 29.01 kg/m²   The patient is a well-developed, well-nourished female   in no acute distress. The patient is alert and oriented times three. The patient is alert and oriented times three. Mood and affect are normal.  LYMPHATIC: lymph nodes are not enlarged and are within normal limits  SKIN: normal in color and non tender to palpation. There are no bruises or abrasions noted. NEUROLOGICAL: Motor sensory exam is within normal limits. Reflexes are equal bilaterally.  There is normal sensation to pinprick and light touch  ORTHOPEDIC EXAM of right shoulder:  Inspection: swelling not present,  Bruising not present  Incisions well healed  Passive glenohumeral abduction 0-60 degrees  Stability: Stable  Strength: n/a  2+ distal pulses      IMAGING: MRI arthrogram of right